# Patient Record
Sex: MALE | Race: WHITE | NOT HISPANIC OR LATINO
[De-identification: names, ages, dates, MRNs, and addresses within clinical notes are randomized per-mention and may not be internally consistent; named-entity substitution may affect disease eponyms.]

---

## 2022-01-12 PROBLEM — Z00.00 ENCOUNTER FOR PREVENTIVE HEALTH EXAMINATION: Status: ACTIVE | Noted: 2022-01-12

## 2022-01-13 ENCOUNTER — NON-APPOINTMENT (OUTPATIENT)
Age: 57
End: 2022-01-13

## 2022-01-18 ENCOUNTER — NON-APPOINTMENT (OUTPATIENT)
Age: 57
End: 2022-01-18

## 2022-01-18 ENCOUNTER — APPOINTMENT (OUTPATIENT)
Dept: CARDIOLOGY | Facility: CLINIC | Age: 57
End: 2022-01-18
Payer: COMMERCIAL

## 2022-01-18 VITALS
WEIGHT: 184 LBS | OXYGEN SATURATION: 98 % | RESPIRATION RATE: 16 BRPM | SYSTOLIC BLOOD PRESSURE: 122 MMHG | TEMPERATURE: 97.7 F | HEIGHT: 76 IN | BODY MASS INDEX: 22.41 KG/M2 | HEART RATE: 67 BPM | DIASTOLIC BLOOD PRESSURE: 76 MMHG

## 2022-01-18 VITALS — DIASTOLIC BLOOD PRESSURE: 73 MMHG | SYSTOLIC BLOOD PRESSURE: 120 MMHG

## 2022-01-18 DIAGNOSIS — Z78.9 OTHER SPECIFIED HEALTH STATUS: ICD-10-CM

## 2022-01-18 DIAGNOSIS — Z80.9 FAMILY HISTORY OF MALIGNANT NEOPLASM, UNSPECIFIED: ICD-10-CM

## 2022-01-18 PROCEDURE — 93246 EXT ECG>7D<15D RECORDING: CPT

## 2022-01-18 PROCEDURE — 93000 ELECTROCARDIOGRAM COMPLETE: CPT | Mod: 59

## 2022-01-18 PROCEDURE — 99203 OFFICE O/P NEW LOW 30 MIN: CPT

## 2022-01-18 RX ORDER — LISINOPRIL 10 MG/1
10 TABLET ORAL
Qty: 30 | Refills: 0 | Status: DISCONTINUED | COMMUNITY
Start: 2022-01-08

## 2022-01-18 RX ORDER — METOPROLOL TARTRATE 25 MG/1
25 TABLET, FILM COATED ORAL
Qty: 60 | Refills: 0 | Status: DISCONTINUED | COMMUNITY
Start: 2022-01-08

## 2022-01-18 NOTE — ASSESSMENT
[FreeTextEntry1] : ECG performed today at the office revealed a NSR 54 bpm, with normal AQRS, FL, QRS and QTc.\par

## 2022-01-18 NOTE — DISCUSSION/SUMMARY
[FreeTextEntry1] : Mr. JAVI TELLEZ is a 56 year male with recent onset of palpitations (SVT?) and newly discovered heart murmur, possible mitral regurgitation? \par At the present time He is completely asymptomatic.\par I have recommended to continue the same medications.\par Will perform a Holter ECG\par We will perform an echocardiogram to assess left ventricular and valvular function.\par We will perform routine laboratory tests\par Routine follow up in 1 month\par

## 2022-01-18 NOTE — HISTORY OF PRESENT ILLNESS
[FreeTextEntry1] : 57 yo man, , no children, works as a physicist for a journal. Recent hospitalization at [facility] on 1/6/2022 for palpitations, fast, regular, last 1-2 minutes, starts and ends suddenly, not associated with SOB, CP, dizziness or other symptoms. Not associated with coffee or caffeine ingestion. At , he was on obs for 24 hours and had an echo that revealed a "leaky valve". Discharged home on meds.\par Today for initial evaluation, still occasional palpitations. Usually at night while on bed. \par No risk factors.\par No surgeries.\par Doesnt smoke\par Occasional alcohol\par Denies the use of illicit drugs\par Received the COVID 19 vaccine\par No family history of heart disease.

## 2022-01-18 NOTE — PHYSICAL EXAM

## 2022-01-25 ENCOUNTER — APPOINTMENT (OUTPATIENT)
Dept: CARDIOLOGY | Facility: CLINIC | Age: 57
End: 2022-01-25
Payer: COMMERCIAL

## 2022-01-25 PROCEDURE — 93306 TTE W/DOPPLER COMPLETE: CPT

## 2022-01-26 DIAGNOSIS — I05.0 RHEUMATIC MITRAL STENOSIS: ICD-10-CM

## 2022-02-09 ENCOUNTER — TRANSCRIPTION ENCOUNTER (OUTPATIENT)
Age: 57
End: 2022-02-09

## 2022-03-07 ENCOUNTER — NON-APPOINTMENT (OUTPATIENT)
Age: 57
End: 2022-03-07

## 2022-03-07 LAB
ALBUMIN SERPL ELPH-MCNC: 4.6 G/DL
ALP BLD-CCNC: 52 U/L
ALT SERPL-CCNC: 16 U/L
ANION GAP SERPL CALC-SCNC: 11 MMOL/L
AST SERPL-CCNC: 17 U/L
BASOPHILS # BLD AUTO: 0.04 K/UL
BASOPHILS NFR BLD AUTO: 0.6 %
BILIRUB DIRECT SERPL-MCNC: 0.2 MG/DL
BILIRUB INDIRECT SERPL-MCNC: 0.5 MG/DL
BILIRUB SERPL-MCNC: 0.7 MG/DL
BUN SERPL-MCNC: 13 MG/DL
CALCIUM SERPL-MCNC: 9.5 MG/DL
CHLORIDE SERPL-SCNC: 106 MMOL/L
CHOLEST SERPL-MCNC: 148 MG/DL
CK SERPL-CCNC: 59 U/L
CO2 SERPL-SCNC: 26 MMOL/L
CREAT SERPL-MCNC: 0.94 MG/DL
EOSINOPHIL # BLD AUTO: 0.13 K/UL
EOSINOPHIL NFR BLD AUTO: 2 %
ESTIMATED AVERAGE GLUCOSE: 108 MG/DL
GLUCOSE SERPL-MCNC: 94 MG/DL
HBA1C MFR BLD HPLC: 5.4 %
HCT VFR BLD CALC: 46.5 %
HDLC SERPL-MCNC: 61 MG/DL
HGB BLD-MCNC: 15.7 G/DL
IMM GRANULOCYTES NFR BLD AUTO: 0.6 %
LDLC SERPL CALC-MCNC: 74 MG/DL
LYMPHOCYTES # BLD AUTO: 2.23 K/UL
LYMPHOCYTES NFR BLD AUTO: 34.6 %
MAN DIFF?: NORMAL
MCHC RBC-ENTMCNC: 31.1 PG
MCHC RBC-ENTMCNC: 33.8 GM/DL
MCV RBC AUTO: 92.1 FL
MONOCYTES # BLD AUTO: 0.58 K/UL
MONOCYTES NFR BLD AUTO: 9 %
NEUTROPHILS # BLD AUTO: 3.42 K/UL
NEUTROPHILS NFR BLD AUTO: 53.2 %
NONHDLC SERPL-MCNC: 87 MG/DL
PLATELET # BLD AUTO: 227 K/UL
POTASSIUM SERPL-SCNC: 4.7 MMOL/L
PROT SERPL-MCNC: 6.6 G/DL
RBC # BLD: 5.05 M/UL
RBC # FLD: 12.6 %
SODIUM SERPL-SCNC: 144 MMOL/L
TRIGL SERPL-MCNC: 69 MG/DL
TSH SERPL-ACNC: 2.56 UIU/ML
WBC # FLD AUTO: 6.44 K/UL

## 2022-03-09 ENCOUNTER — APPOINTMENT (OUTPATIENT)
Dept: CARDIOLOGY | Facility: CLINIC | Age: 57
End: 2022-03-09

## 2022-05-23 ENCOUNTER — FORM ENCOUNTER (OUTPATIENT)
Age: 57
End: 2022-05-23

## 2022-05-23 RX ORDER — CHLORHEXIDINE GLUCONATE 213 G/1000ML
1 SOLUTION TOPICAL ONCE
Refills: 0 | Status: DISCONTINUED | OUTPATIENT
Start: 2022-05-24 | End: 2022-06-07

## 2022-05-24 ENCOUNTER — TRANSCRIPTION ENCOUNTER (OUTPATIENT)
Age: 57
End: 2022-05-24

## 2022-05-24 ENCOUNTER — OUTPATIENT (OUTPATIENT)
Dept: OUTPATIENT SERVICES | Facility: HOSPITAL | Age: 57
LOS: 1 days | End: 2022-05-24
Payer: COMMERCIAL

## 2022-05-24 VITALS
TEMPERATURE: 98 F | DIASTOLIC BLOOD PRESSURE: 69 MMHG | RESPIRATION RATE: 20 BRPM | HEART RATE: 60 BPM | OXYGEN SATURATION: 100 % | SYSTOLIC BLOOD PRESSURE: 136 MMHG

## 2022-05-24 DIAGNOSIS — Z98.890 OTHER SPECIFIED POSTPROCEDURAL STATES: Chronic | ICD-10-CM

## 2022-05-24 DIAGNOSIS — I05.2 RHEUMATIC MITRAL STENOSIS WITH INSUFFICIENCY: ICD-10-CM

## 2022-05-24 DIAGNOSIS — Z90.89 ACQUIRED ABSENCE OF OTHER ORGANS: Chronic | ICD-10-CM

## 2022-05-24 LAB
ANION GAP SERPL CALC-SCNC: 10 MMOL/L — SIGNIFICANT CHANGE UP (ref 5–17)
BASOPHILS # BLD AUTO: 0.04 K/UL — SIGNIFICANT CHANGE UP (ref 0–0.2)
BASOPHILS NFR BLD AUTO: 0.6 % — SIGNIFICANT CHANGE UP (ref 0–2)
BUN SERPL-MCNC: 16.5 MG/DL — SIGNIFICANT CHANGE UP (ref 8–20)
CALCIUM SERPL-MCNC: 8.5 MG/DL — LOW (ref 8.6–10.2)
CHLORIDE SERPL-SCNC: 105 MMOL/L — SIGNIFICANT CHANGE UP (ref 98–107)
CO2 SERPL-SCNC: 25 MMOL/L — SIGNIFICANT CHANGE UP (ref 22–29)
CREAT SERPL-MCNC: 0.87 MG/DL — SIGNIFICANT CHANGE UP (ref 0.5–1.3)
EGFR: 101 ML/MIN/1.73M2 — SIGNIFICANT CHANGE UP
EOSINOPHIL # BLD AUTO: 0.17 K/UL — SIGNIFICANT CHANGE UP (ref 0–0.5)
EOSINOPHIL NFR BLD AUTO: 2.6 % — SIGNIFICANT CHANGE UP (ref 0–6)
GLUCOSE SERPL-MCNC: 101 MG/DL — HIGH (ref 70–99)
HCT VFR BLD CALC: 44.7 % — SIGNIFICANT CHANGE UP (ref 39–50)
HGB BLD-MCNC: 15.3 G/DL — SIGNIFICANT CHANGE UP (ref 13–17)
IMM GRANULOCYTES NFR BLD AUTO: 0.9 % — SIGNIFICANT CHANGE UP (ref 0–1.5)
LYMPHOCYTES # BLD AUTO: 1.69 K/UL — SIGNIFICANT CHANGE UP (ref 1–3.3)
LYMPHOCYTES # BLD AUTO: 25.5 % — SIGNIFICANT CHANGE UP (ref 13–44)
MCHC RBC-ENTMCNC: 31.2 PG — SIGNIFICANT CHANGE UP (ref 27–34)
MCHC RBC-ENTMCNC: 34.2 GM/DL — SIGNIFICANT CHANGE UP (ref 32–36)
MCV RBC AUTO: 91.2 FL — SIGNIFICANT CHANGE UP (ref 80–100)
MONOCYTES # BLD AUTO: 0.62 K/UL — SIGNIFICANT CHANGE UP (ref 0–0.9)
MONOCYTES NFR BLD AUTO: 9.4 % — SIGNIFICANT CHANGE UP (ref 2–14)
NEUTROPHILS # BLD AUTO: 4.04 K/UL — SIGNIFICANT CHANGE UP (ref 1.8–7.4)
NEUTROPHILS NFR BLD AUTO: 61 % — SIGNIFICANT CHANGE UP (ref 43–77)
PLATELET # BLD AUTO: 214 K/UL — SIGNIFICANT CHANGE UP (ref 150–400)
POTASSIUM SERPL-MCNC: 4.6 MMOL/L — SIGNIFICANT CHANGE UP (ref 3.5–5.3)
POTASSIUM SERPL-SCNC: 4.6 MMOL/L — SIGNIFICANT CHANGE UP (ref 3.5–5.3)
RBC # BLD: 4.9 M/UL — SIGNIFICANT CHANGE UP (ref 4.2–5.8)
RBC # FLD: 12.1 % — SIGNIFICANT CHANGE UP (ref 10.3–14.5)
SODIUM SERPL-SCNC: 140 MMOL/L — SIGNIFICANT CHANGE UP (ref 135–145)
WBC # BLD: 6.62 K/UL — SIGNIFICANT CHANGE UP (ref 3.8–10.5)
WBC # FLD AUTO: 6.62 K/UL — SIGNIFICANT CHANGE UP (ref 3.8–10.5)

## 2022-05-24 PROCEDURE — 93312 ECHO TRANSESOPHAGEAL: CPT | Mod: 26

## 2022-05-24 PROCEDURE — 93320 DOPPLER ECHO COMPLETE: CPT | Mod: 26

## 2022-05-24 PROCEDURE — 93325 DOPPLER ECHO COLOR FLOW MAPG: CPT | Mod: 26

## 2022-05-24 PROCEDURE — 93005 ELECTROCARDIOGRAM TRACING: CPT

## 2022-05-24 PROCEDURE — 85025 COMPLETE CBC W/AUTO DIFF WBC: CPT

## 2022-05-24 PROCEDURE — 93312 ECHO TRANSESOPHAGEAL: CPT

## 2022-05-24 PROCEDURE — 93320 DOPPLER ECHO COMPLETE: CPT

## 2022-05-24 PROCEDURE — 93010 ELECTROCARDIOGRAM REPORT: CPT

## 2022-05-24 PROCEDURE — 36415 COLL VENOUS BLD VENIPUNCTURE: CPT

## 2022-05-24 PROCEDURE — 93325 DOPPLER ECHO COLOR FLOW MAPG: CPT

## 2022-05-24 PROCEDURE — 80048 BASIC METABOLIC PNL TOTAL CA: CPT

## 2022-05-24 NOTE — DISCHARGE NOTE PROVIDER - NSDCCPCAREPLAN_GEN_ALL_CORE_FT
PRINCIPAL DISCHARGE DIAGNOSIS  Diagnosis: Severe mitral regurgitation  Assessment and Plan of Treatment: Follow up with Cardiologist outpatient

## 2022-05-24 NOTE — DISCHARGE NOTE PROVIDER - NSDCCPTREATMENT_GEN_ALL_CORE_FT
PRINCIPAL PROCEDURE  Procedure: Complete transesophageal echocardiography (LOULOU) with Doppler imaging  Findings and Treatment: Notify your doctor if you develop a fever, cough up blood, have any chest pain, palpitations or difficulty breathing. You may take a throat lozenge if you develop a sore throat or try warm salt water gargle. Resume your diet with soft foods first. Follow up with your cardiologist within 2 weeks for a follow up or sooner with any concerns. Report to the nearest ER with any emergencies.

## 2022-05-24 NOTE — PROGRESS NOTE ADULT - SUBJECTIVE AND OBJECTIVE BOX
Cardiology NP post procedure note:     -s/p LOULOU: severe MR; no endocarditis seen (prelim report; official report to follow)      MEDICATIONS  (STANDING):  chlorhexidine 4% Liquid 1 Application(s) Topical Once      Allergies:  No Known Allergies    PAST MEDICAL & SURGICAL HISTORY:  Hypertension      Palpitations      History of tonsillectomy      History of circumcision          Vital Signs Last 24 Hrs  T(C): 36.7 (24 May 2022 09:10), Max: 36.7 (24 May 2022 09:10)  T(F): 98.1 (24 May 2022 09:10), Max: 98.1 (24 May 2022 09:10)  HR: 60 (24 May 2022 09:10) (60 - 60)  BP: 136/69 (24 May 2022 09:10) (136/69 - 136/69)  BP(mean): --  RR: 20 (24 May 2022 09:10) (20 - 20)  SpO2: 100% (24 May 2022 09:10) (100% - 100%)    Physical Exam:  Constitutional: NAD, AAOx3  Cardiovascular: +S1S2 RRR  Pulmonary: CTA b/l, unlabored  GI: soft NTND +BS  Extremities: no pedal edema, +distal pulses b/l  Neuro: non focal, ZHANG x4    LABS:                        15.3   6.62  )-----------( 214      ( 24 May 2022 08:57 )             44.7     05-24    140  |  105  |  16.5  ----------------------------<  101<H>  4.6   |  25.0  |  0.87    Ca    8.5<L>      24 May 2022 08:57            RADIOLOGY & ADDITIONAL TESTS:

## 2022-05-24 NOTE — DISCHARGE NOTE PROVIDER - CARE PROVIDER_API CALL
Jesus Chaudhari)  Cardiovascular Disease; Internal Medicine; Interventional Cardiology  [address] [place] [address]  Phone: [phone]  Fax: [phone]  Established Patient  Follow Up Time: 2 weeks

## 2022-05-24 NOTE — DISCHARGE NOTE PROVIDER - NSDCFUADDINST_GEN_ALL_CORE_FT
Notify your doctor if you develop a fever, cough up blood, have any chest pain, palpitations or difficulty breathing. You may take a throat lozenge if you develop a sore throat or try warm salt water gargle. Resume your diet with soft foods first. Follow up with your cardiologist within 2 weeks for a follow up or sooner with any concerns. Report to the nearest ER with any emergencies.

## 2022-05-24 NOTE — DISCHARGE NOTE PROVIDER - HOSPITAL COURSE
This is a 55 y/o male with h/o HTN s/p recent hospitalization at [facility] on 1/6/22 for palpitations, fast, regular, last 1-2 minutes, starts and ends suddenly, not associated with SOB, CP, dizziness or other symptoms.  Not associated with coffee or caffeine ingestion.  At , he was observed for 24 hours and had an echo that revealed a "leaky valve."  He was discharged home and followed up with Dr. Chaudhari in the office.  Complains of palpitations usually at night while in bed.  TTE  revealed normal LVSF; EF 60-65%; severely enlarged LA; moderately dilated RA; anterior MV leaflet is thickened, possibility of infective of marantic endocarditis is raised, partial flail of A2 resulting in a severe eccentric posteriorly directed jet of MR; LA moderately enlarged; mild to moderate TR; no pericardial effusion.  Routine blood work was done.  Patient presents today for LOULOU to assess the MR and the leaflets.  LOULOU revealed severe MR with no endocarditis seen (prelim report; official report to follow)  -May discharge patient to home once fully awake and tolerating PO intake while remaining stable  -Post LOULOU VS as per orders  -Follow up with Dr. Chaudhari as outpatient in one to two weeks  -Continue current med regimen  -Activity instructions discussed with patients verbal understanding

## 2022-05-24 NOTE — H&P PST ADULT - ASSESSMENT
Palpitations and mitral regurgitation with partial flail of A2 for LOULOU for further evaluation    -NPO for procedure  -Consent to be obtained by MD

## 2022-05-24 NOTE — ASU PATIENT PROFILE, ADULT - FALL HARM RISK - PT AGE POPULATION HIDDEN
Renal function is stable, you have chronic kidney disease stage 3  Blood pressure slightly elevated, recommend home monitoring of blood pressure with goal blood pressure less than 135/85  If blood pressure is persistently more than 140/90 please let me know  Discussed about low-sodium diet  Continue same medication  Avoid nephrotoxins  Will follow up in 4 months with repeat labs 
Adult

## 2022-05-24 NOTE — H&P PST ADULT - NSANTHOSAYNRD_GEN_A_CORE
No. BRITNI screening performed.  STOP BANG Legend: 0-2 = LOW Risk; 3-4 = INTERMEDIATE Risk; 5-8 = HIGH Risk

## 2022-05-24 NOTE — DISCHARGE NOTE NURSING/CASE MANAGEMENT/SOCIAL WORK - NSDCPEFALRISK_GEN_ALL_CORE
For information on Fall & Injury Prevention, visit: [email or web address] OR  [email or web address] OR  https://www.cdc.gov/steadi/patient.html

## 2022-05-24 NOTE — DISCHARGE NOTE NURSING/CASE MANAGEMENT/SOCIAL WORK - PATIENT PORTAL LINK FT
You can access the FollowMyHealth Patient Portal offered by [facility] by registering at the following website: [email or web address]. By joining [facility]’s FollowMyHealth portal, you will also be able to view your health information using other applications (apps) compatible with our system.

## 2022-05-24 NOTE — DISCHARGE NOTE PROVIDER - NSDCMRMEDTOKEN_GEN_ALL_CORE_FT
Aspir 81 oral delayed release tablet: 1 tab(s) orally once a day  lisinopril 10 mg oral tablet: 1 tab(s) orally once a day  Metoprolol Tartrate 25 mg oral tablet: 1 tab(s) orally 2 times a day

## 2022-05-24 NOTE — PROGRESS NOTE ADULT - ASSESSMENT
A/P: This is a 55 y/o male with h/o HTN s/p recent [place] [facility] [place] for palpitations, fast, regular, last 1-2 minutes, starts and ends suddenly, not associated with SOB, CP, dizziness or other symptoms.  Not associated with coffee or caffeine ingestion.  At , he was observed for 24 hours and had an echo that revealed a "leaky valve."  He was discharged home and followed up with Dr. Chaudhari in the office.  Complains of palpitations usually at night while in bed.  TTE  revealed normal LVSF; EF 60-65%; severely enlarged LA; moderately dilated RA; anterior MV leaflet is thickened, possibility of infective of marantic endocarditis is raised, partial flail of A2 resulting in a severe eccentric posteriorly directed jet of MR; LA moderately enlarged; mild to moderate TR; no pericardial effusion.  Routine blood work was done.  Patient presents today for LOULOU to assess the MR and the leaflets.  LOULOU revealed severe MR with no endocarditis seen (prelim report; official report to follow)  -May discharge patient to home once fully awake and tolerating PO intake while remaining stable  -Post LOULOU VS as per orders  -Follow up with Dr. Chaudhari as outpatient in one to two weeks  -Continue current med regimen  -Activity instructions discussed with patients verbal understanding

## 2022-05-25 PROBLEM — R00.2 PALPITATIONS: Chronic | Status: ACTIVE | Noted: 2022-05-24

## 2022-05-25 PROBLEM — I10 ESSENTIAL (PRIMARY) HYPERTENSION: Chronic | Status: ACTIVE | Noted: 2022-05-24

## 2022-06-06 ENCOUNTER — RX RENEWAL (OUTPATIENT)
Age: 57
End: 2022-06-06

## 2022-06-07 ENCOUNTER — NON-APPOINTMENT (OUTPATIENT)
Age: 57
End: 2022-06-07

## 2022-06-07 ENCOUNTER — APPOINTMENT (OUTPATIENT)
Dept: CARDIOLOGY | Facility: CLINIC | Age: 57
End: 2022-06-07
Payer: MEDICARE

## 2022-06-07 VITALS
HEART RATE: 63 BPM | OXYGEN SATURATION: 98 % | BODY MASS INDEX: 22.16 KG/M2 | WEIGHT: 182 LBS | TEMPERATURE: 98.5 F | SYSTOLIC BLOOD PRESSURE: 120 MMHG | DIASTOLIC BLOOD PRESSURE: 71 MMHG | HEIGHT: 76 IN

## 2022-06-07 PROCEDURE — 93000 ELECTROCARDIOGRAM COMPLETE: CPT

## 2022-06-07 PROCEDURE — 99214 OFFICE O/P EST MOD 30 MIN: CPT

## 2022-06-07 NOTE — DISCUSSION/SUMMARY
[FreeTextEntry1] : Mr. JAVI TELLEZ is a 56 year male with recent onset of palpitations (SVT?) and newly discovered severe mitral regurgitation. Had a TTE and LOULOU that revealed severe eccentric MR with a regurgitant volume of 190 mL an LVEF=58%. Marantic endocarditis? Has not undergone evaluation for possible etiologies. \par He also has an abdominal bruit.  \par Holter revealed two runs of NSVT and SVT. \par At the present time He is completely asymptomatic.\par I have recommended to continue the same medications.\par Will request an abdominal a thoracic aorta CT.\par Will request a surgical consultation with Dr Pollock for possible MVR\par Routine follow up in 3 months\par

## 2022-06-07 NOTE — PHYSICAL EXAM

## 2022-06-07 NOTE — ASSESSMENT
[FreeTextEntry1] : ECG performed today at the office revealed a NSR 58 bpm, with normal AQRS, OK, QRS and QTc.\par

## 2022-06-07 NOTE — HISTORY OF PRESENT ILLNESS
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 Not associated with coffee or caffeine ingestion. At , he was on obs for 24 hours and had an echo that revealed a "leaky valve". Discharged home on meds.\par Had an TTE that revealed a thickened anterior mitral valve leaflet. Partial flail A2 with severe eccentric MR. The LA is moderately enlarged. Possible marantic endocarditis?\par On 5/24/2022 he had a LOULOU that revealed ruptured chordae with thickened and redundant mitral valve leaflet. Severe MR.  Mild TR. LVEF=57%. \par Today for follow up. At the present time patient is completely asymptomatic and denies CP, SOB, orthopnea or PND. Denies palpitations, dizziness or ankle swelling.\par No risk factors.\par No surgeries.\par Doesnt smoke\par Occasional alcohol\par Denies the use of illicit drugs\par Received the COVID 19 vaccine\par No family history of heart disease.

## 2022-06-14 ENCOUNTER — APPOINTMENT (OUTPATIENT)
Dept: CARDIOTHORACIC SURGERY | Facility: CLINIC | Age: 57
End: 2022-06-14
Payer: MEDICARE

## 2022-06-14 VITALS
HEART RATE: 66 BPM | HEIGHT: 76 IN | DIASTOLIC BLOOD PRESSURE: 82 MMHG | BODY MASS INDEX: 22.16 KG/M2 | OXYGEN SATURATION: 98 % | RESPIRATION RATE: 16 BRPM | SYSTOLIC BLOOD PRESSURE: 152 MMHG | WEIGHT: 182 LBS

## 2022-06-14 PROCEDURE — 99205 OFFICE O/P NEW HI 60 MIN: CPT

## 2022-06-14 NOTE — PHYSICAL EXAM
[General Appearance - Well Nourished] : well nourished [General Appearance - Well Developed] : well developed [Sclera] : the sclera and conjunctiva were normal [Outer Ear] : the ears and nose were normal in appearance [Neck Appearance] : the appearance of the neck was normal [Respiration, Rhythm And Depth] : normal respiratory rhythm and effort [Auscultation Breath Sounds / Voice Sounds] : lungs were clear to auscultation bilaterally [Heart Rate And Rhythm] : heart rate was normal and rhythm regular [FreeTextEntry1] : NYHAC I   Grade 3/6 systolic murmur [Examination Of The Chest] : the chest was normal in appearance [Right Carotid Bruit] : no bruit heard over the right carotid [Left Carotid Bruit] : no bruit heard over the left carotid [2+] : left 2+ [Abnormal Walk] : normal gait [Motor Tone] : muscle strength and tone were normal [Skin Color & Pigmentation] : normal skin color and pigmentation [Sensation] : the sensory exam was normal to light touch and pinprick [Motor Exam] : the motor exam was normal [Oriented To Time, Place, And Person] : oriented to person, place, and time [Impaired Insight] : insight and judgment were intact

## 2022-06-14 NOTE — REVIEW OF SYSTEMS
[Palpitations] : palpitations [SOB on Exertion] : shortness of breath during exertion [Negative] : Heme/Lymph [Feeling Poorly] : not feeling poorly [Feeling Tired] : not feeling tired [Chest Pain] : no chest pain [Lower Ext Edema] : no extremity edema [Shortness Of Breath] : no shortness of breath [Cough] : no cough [Orthopnea] : no orthopnea

## 2022-06-14 NOTE — DATA REVIEWED
[FreeTextEntry1] : Transesophageal Echocardiogram from 5/24/22 at [facility] \par - LVEF 55-60%\par - Ruptured chordae and possibly tip of papillary muscle to P2 and P3 scallops with thickened and redundant mitral valve leaflet resulting in severe and posteriorly directed MR jet. MR volume is 190 ml.\par - Mild tricuspid valve regurgitation\par - A pattern of systolic flow reversal, suggestive of severe mitral valve regurgitation is recorded from the right lower pulmonary vein.\par \par \par \par \par \par Transthoracic Echocardiogram from 1/25/22 at [facility] \par Summary:\par 1. Technically good study.\par 2. Normal global left ventricular systolic function.\par 3. Left ventricular ejection fraction, by visual estimation, is 60 to 65%.\par 4. Mildly increased left ventricular internal cavity size.\par 5. LV EF by Biplane MOD = 63%. LAP is estimated to be in normal range.\par 6. Severely enlarged left atrium.\par 7. Moderately dilated right atrium.\par 8. The anterior mitral valve leaflet is thickened. Possibility of infective of marantic endocarditis is raised. There is partial flail of A2, resulting in a severe eccentric posteriorly directed jet of MR. The left atrium is moderately enlarged. DW Dr. Chaudhari. Recommend LOULOU, blood cultures. and if needed rheumatologic workup.\par 9. Mild-moderate tricuspid regurgitation.\par 10. There is no evidence of pericardial effusion.

## 2022-06-14 NOTE — CONSULT LETTER
[Dear  ___] : Dear  [unfilled], [Consult Letter:] : I had the pleasure of evaluating your patient, [unfilled]. [Please see my note below.] : Please see my note below. [Consult Closing:] : Thank you very much for allowing me to participate in the care of this patient.  If you have any questions, please do not hesitate to contact me. [Sincerely,] : Sincerely, [FreeTextEntry2] : Jesus Chaudhari MD [FreeTextEntry3] : Babar Pollock MD\par  of Cardiothoracic Surgery\par [facility]\par [address] \[place] [address]\par [phone]\par

## 2022-06-14 NOTE — ASSESSMENT
[FreeTextEntry1] : There is a prolapse of one of the leaflets due to a ruptured chordae. Over time the heart can become weaker. At this time he is very mildly effected by symptoms. Options presented were to perform a mitral valve repair with the possibility of a replacement. We discussed the risk and benefit of utilizing mechanical vs bioprosthetic valve. \par \par Surgical intervention should be considered at this time to prevent future heart function deficit and prevention of rhythm disturbances such as atrial fibrillation. Mr Corcoran reports having a CTA of the chest last week at [facility]. The report is currently unread. Should his calcium score be zero we can defer cardiac catheterization.\par \par Risks benefits and alternatives to mitral valve repair with possible replacement were discussed with the patient in detail. Risks discussed included, but not limited to infection, bleeding, myocardial infarction, cerebrovascular accident, renal failure, vascular injury requiring intervention, cardiac rupture, and death. \par \par The procedure, hospital stay and recovery was discussed in detail.  All questions addressed. Patient would like to discuss the options with his wafe and call with the decision to proceed.\par \par PLAN:\par - Cardiac Catheterization if Calcium Score is greater than zero ([facility] on 6/10/22)\par - Pulmonary Function Testing\par - Carotid Ultrasound\par - Mitral Valve Repair with possible replacement (Bioprosthetic if needed)

## 2022-06-14 NOTE — HISTORY OF PRESENT ILLNESS
[FreeTextEntry1] : Mr. CORCORAN is a 56 year old male referred by Dr. Chaudhari who presents for consultation. His past medical history includes tonsillectomy. 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 \par \par He was evaluated at [facility] January 6th for palpitations and shortness of breath. An echocardiogram was performed at that time revealing a "leaky valve". He had followed up with Cardiology post discharge and echocardiogram was performed revealing mitral valve dysfunction. \par \par Mr Corcoran presents to the office today to discuss possible mitral valve intervention. He denies any chest pain, lower extremity edema, or syncope. He has been having palpitations at night when he is in bed associated with mild shortness of breath. He currently works full time as an  for a [employer].

## 2022-09-01 DIAGNOSIS — Z01.811 ENCOUNTER FOR PREPROCEDURAL RESPIRATORY EXAMINATION: ICD-10-CM

## 2022-09-14 ENCOUNTER — NON-APPOINTMENT (OUTPATIENT)
Age: 57
End: 2022-09-14

## 2022-09-14 NOTE — H&P PST ADULT - NSICDXFAMILYHX_GEN_ALL_CORE_FT
FAMILY HISTORY:  Mother  Still living? Unknown  Family history of atrial fibrillation, Age at diagnosis: Age Unknown

## 2022-09-14 NOTE — H&P PST ADULT - NSICDXPASTMEDICALHX_GEN_ALL_CORE_FT
PAST MEDICAL HISTORY:  Hypertension     Mitral valve stenosis, moderate     Nonrheumatic mitral valve regurgitation     Palpitations

## 2022-09-14 NOTE — H&P PST ADULT - OTHER CARE PROVIDERS
Jesus Chaudhari ([facility], [address] [place] [address], Phone: [phone], Fax: [phone]), Babar Pollock ([facility], [address] [place] [address], Tel: [phone])

## 2022-09-14 NOTE — H&P PST ADULT - ASSESSMENT
Assessment:     ASA:   Mall:   ABR:   COVID:   GFR:   Creatinine:   10 Year ASCVD Risk:     Problem List:   1.   ·	LHC and possible intervention. Consent obtained.  ·	Procedure explained and questions answered.   ·	Will start DAPT if PCI performed.  ·	IV:  mL IV over 1 hour pre and post procedure  ·	Aspirin if not taken today.    2.     Discharge Planning:   ·	Discharge today if no PCI performed.  ·	Discharge in AM if PCI performed. Assessment: 55y/o male never smoker with history of HTN who went to [facility] for palpitations and had an echo done that showed a "leaky valve." He followed up with Dr. Chaudhari who did and echo which showed a ruptured chordae and possibly tip of papillary muscle to the P2 and P3 scallops with thickened and redundant mitral valve leaflet resulting in severe and posteriorly directed MR jet.     ASA: 2  Mall: 2  ABR:   COVID: Negative 9/12/2022  GFR:   Creatinine:   10 Year ASCVD Risk:     Problem List:   1. MV regurgitation secondary to ruptured chordae  ·	LHC and possible intervention. Consent obtained.  ·	Procedure explained and questions answered.   ·	Will start DAPT if PCI performed.  ·	IV:  mL IV over 1 hour pre and post procedure  ·	Aspirin if not taken today.    2. HTN  ·	BP Range Last 24 Hours:   ·	Beta Blocker:   ·	RAASi:   ·	CCB:   ·	Other:     Discharge Planning:   ·	Discharge today if no PCI performed.  ·	Discharge in AM if PCI performed. Assessment: 55y/o male never smoker with history of HTN who went to [facility] for palpitations and had an echo done that showed a "leaky valve." He followed up with Dr. Chaudhari who did and echo which showed a ruptured chordae and possibly tip of papillary muscle to the P2 and P3 scallops with thickened and redundant mitral valve leaflet resulting in severe and posteriorly directed MR jet.     ASA: 2  Mall: 2  ABR: 1.6%  COVID: Negative 9/12/2022  GFR: 101  Creatinine: 0.89  10 Year ASCVD Risk: 5.0%    Problem List:   1. MV regurgitation secondary to ruptured chordae  ·	C and possible intervention. Consent obtained.  ·	Procedure explained and questions answered.   ·	Will start DAPT if PCI performed.  ·	IV:  mL IV over 1 hour pre and post procedure  ·	Aspirin if not taken today.    2. HTN  ·	Beta Blocker: Metoprolol 25 mg BID  ·	RAASi: Lisinopril 10 mg daily  ·	CCB: N/A  ·	Other: N/A    Discharge Planning:   ·	Discharge today if no PCI performed.  ·	Discharge in AM if PCI performed.

## 2022-09-14 NOTE — H&P PST ADULT - PRIMARY CARE PROVIDER
Nelsy Reyes ( Nelsy Pierce ([address], [place] [address], Phone: [phone], Fax: [phone])

## 2022-09-14 NOTE — H&P PST ADULT - HISTORY OF PRESENT ILLNESS
Symptoms:        Angina (Class):        Ischemic Symptoms:     Heart Failure:        Systolic/Diastolic/Combined:        NYHA Class (within 2 weeks):     Assessment of LVEF:       EF: 40%       Assessed by: TTE       Date: 6/15/2022    Prior Cardiac Interventions:       PCI's: N/A       CABG: N/A    Noninvasive Testing:   Stress Test: N/A    LOULOU: 5/24/2022  Left Ventricle: Global LV systolic function was normal. Left ventricular ejection fraction, by visual estimation, is 55 to 60%. LVEF by 3D imaging is 57%.  Right Ventricle: Normal right ventricular size and function.  Left Atrium: Left atrial enlargement. No left atrial appendage thrombus is seen. Color flow doppler and intravenous injection of agitated saline demonstrates the presence of an intact intra atrial septum.  Right Atrium: Normal right atrial size.  Pericardium: There is no evidence of pericardial effusion.  Mitral Valve: Ruptured chordae and possibly tip of papillary muscle to the P2 and P3 scallops with thickened and redundant mitral valve leaflet resulting in severe and posteriorly directed MR jet. MR volume is 190 ml.  Tricuspid Valve: The tricuspid valve is normal in structure. Mild tricuspid regurgitation is visualized.  Aortic Valve: The aortic valve is trileaflet. No evidence of aortic stenosis. No evidence of aortic valve regurgitation is seen.  Pulmonic Valve: Structurally normal pulmonic valve, with normal leaflet excursion.  Aorta: The aortic root, ascending aorta, aortic arch and descending aorta are all structurally normal, with no evidence of dilitation or obstruction.  Venous: A pattern of systolic flow reversal, suggestive of severe mitral regurgitation is recorded from the right lower pulmonary vein.  Shunts: Agitated saline contrast was given intravenously to evaluate for intracardiac shunting.    Antianginal Therapies:        Beta Blockers: N/A       Calcium Channel Blockers: N/A       Long Acting Nitrates: N/A       Ranexa: N/A    Associated Risk Factors:        Frailty: N/A       Cerebrovascular Disease: N/A       Chronic Lung Disease: N/A       Peripheral Arterial Disease: N/A       Chronic Kidney Disease (if yes, what is GFR): N/A       Uncontrolled Diabetes (if yes, what is HgbA1C or FBS): N/A       Poorly Controlled Hypertension (if yes, what is SBP): N/A       Morbid Obesity (if yes, what is BMI): N/A       History of Recent Ventricular Arrhythmia: N/A       Inability to Ambulate Safely: N/A       Need for Therapeutic Anticoagulation: N/A       Antiplatelet or Contrast Allergy: N/A    Social History:        Marital:        Tobacco:        ETOH:        Caffeine:     ROS:   General: No fevers/chils. No fatigue  HEENT: No hearing loss. No visual disturbances. No headaches. No epistaxis.  Pulmonary: No dyspnea. No wheeze. No cough.  CV: No chest pain. No DOUGHERTY. No palpitations. No orthopnea. No PND. No edema.  GI: No BRBPR. No melena. No nausea.  : No hematuria.  Neuro: No weakness. No paresthesia. No syncope.    T(C): --  HR: --  BP: --  RR: --  SpO2: --  Physical Exam:   General: Awake, alert, speech clear, no acute distress.  Neck: No bruit, no JVD.  Chest: S1, S2. No murmur. CTA.  Abdomen: Soft. Nondistended.  Extremities: No edema. Pulses: DP: Right: 2+, Left: 2+, Radial: Right: 2+, Left: 2+ 57y/o male never smoker who went to [facility] [place] for palpitations and had an echo done that showed a "leaky valve." He followed up with Dr. Chaudhari who did and echo which showed a ruptured chordae and possibly tip of papillary muscle to the P2 and P3 scallops with thickened and redundant mitral valve leaflet resulting in severe and posteriorly directed MR jet.     Symptoms:        Angina (Class):        Ischemic Symptoms:     Heart Failure:        Systolic/Diastolic/Combined:        NYHA Class (within 2 weeks):     Assessment of LVEF:       EF: 40%       Assessed by: TTE       Date: 6/15/2022    Prior Cardiac Interventions:       PCI's: N/A       CABG: N/A    Noninvasive Testing:   Stress Test: N/A    LOULOU: 5/24/2022  Left Ventricle: Global LV systolic function was normal. Left ventricular ejection fraction, by visual estimation, is 55 to 60%. LVEF by 3D imaging is 57%.  Right Ventricle: Normal right ventricular size and function.  Left Atrium: Left atrial enlargement. No left atrial appendage thrombus is seen. Color flow doppler and intravenous injection of agitated saline demonstrates the presence of an intact intra atrial septum.  Right Atrium: Normal right atrial size.  Pericardium: There is no evidence of pericardial effusion.  Mitral Valve: Ruptured chordae and possibly tip of papillary muscle to the P2 and P3 scallops with thickened and redundant mitral valve leaflet resulting in severe and posteriorly directed MR jet. MR volume is 190 ml.  Tricuspid Valve: The tricuspid valve is normal in structure. Mild tricuspid regurgitation is visualized.  Aortic Valve: The aortic valve is trileaflet. No evidence of aortic stenosis. No evidence of aortic valve regurgitation is seen.  Pulmonic Valve: Structurally normal pulmonic valve, with normal leaflet excursion.  Aorta: The aortic root, ascending aorta, aortic arch and descending aorta are all structurally normal, with no evidence of dilitation or obstruction.  Venous: A pattern of systolic flow reversal, suggestive of severe mitral regurgitation is recorded from the right lower pulmonary vein.  Shunts: Agitated saline contrast was given intravenously to evaluate for intracardiac shunting.    Antianginal Therapies:        Beta Blockers: N/A       Calcium Channel Blockers: N/A       Long Acting Nitrates: N/A       Ranexa: N/A    Associated Risk Factors:        Frailty: N/A       Cerebrovascular Disease: N/A       Chronic Lung Disease: N/A       Peripheral Arterial Disease: N/A       Chronic Kidney Disease (if yes, what is GFR): N/A       Uncontrolled Diabetes (if yes, what is HgbA1C or FBS): N/A       Poorly Controlled Hypertension (if yes, what is SBP): N/A       Morbid Obesity (if yes, what is BMI): N/A       History of Recent Ventricular Arrhythmia: N/A       Inability to Ambulate Safely: N/A       Need for Therapeutic Anticoagulation: N/A       Antiplatelet or Contrast Allergy: N/A    Social History:        Marital:        Tobacco:        ETOH:        Caffeine:     ROS:   General: No fevers/chills. No fatigue  HEENT: No hearing loss. No visual disturbances. No headaches. No epistaxis.  Pulmonary: No dyspnea. No wheeze. No cough.  CV: No chest pain. No DOUGHERTY. No palpitations. No orthopnea. No PND. No edema.  GI: No BRBPR. No melena. No nausea.  : No hematuria.  Neuro: No weakness. No paresthesia. No syncope.    T(C): --  HR: --  BP: --  RR: --  SpO2: --  Physical Exam:   General: Awake, alert, speech clear, no acute distress.  Neck: No bruit, no JVD.  Chest: S1, S2. No murmur. CTA.  Abdomen: Soft. Nondistended.  Extremities: No edema. Pulses: DP: Right: 2+, Left: 2+, Radial: Right: 2+, Left: 2+ 57y/o male never smoker with history of HTN who [place] [facility] [place] and had an echo done that showed a "leaky valve." He followed up with Dr. Chaudhari who did and echo which showed a ruptured chordae and possibly tip of papillary muscle to the P2 and P3 scallops with thickened and redundant mitral valve leaflet resulting in severe and posteriorly directed MR jet.     Symptoms:        Angina (Class): N/A       Ischemic Symptoms: N/A    Heart Failure: N/A    Assessment of LVEF:       EF: 40%       Assessed by: TTE       Date: 6/15/2022    Prior Cardiac Interventions:       PCI's: N/A       CABG: N/A    Noninvasive Testing:   Stress Test: N/A    LOULOU: 5/24/2022  Left Ventricle: Global LV systolic function was normal. Left ventricular ejection fraction, by visual estimation, is 55 to 60%. LVEF by 3D imaging is 57%.  Right Ventricle: Normal right ventricular size and function.  Left Atrium: Left atrial enlargement. No left atrial appendage thrombus is seen. Color flow doppler and intravenous injection of agitated saline demonstrates the presence of an intact intra atrial septum.  Right Atrium: Normal right atrial size.  Pericardium: There is no evidence of pericardial effusion.  Mitral Valve: Ruptured chordae and possibly tip of papillary muscle to the P2 and P3 scallops with thickened and redundant mitral valve leaflet resulting in severe and posteriorly directed MR jet. MR volume is 190 ml.  Tricuspid Valve: The tricuspid valve is normal in structure. Mild tricuspid regurgitation is visualized.  Aortic Valve: The aortic valve is trileaflet. No evidence of aortic stenosis. No evidence of aortic valve regurgitation is seen.  Pulmonic Valve: Structurally normal pulmonic valve, with normal leaflet excursion.  Aorta: The aortic root, ascending aorta, aortic arch and descending aorta are all structurally normal, with no evidence of dilitation or obstruction.  Venous: A pattern of systolic flow reversal, suggestive of severe mitral regurgitation is recorded from the right lower pulmonary vein.  Shunts: Agitated saline contrast was given intravenously to evaluate for intracardiac shunting.    Antianginal Therapies:        Beta Blockers: Metoprolol 25 mg BID       Calcium Channel Blockers: N/A       Long Acting Nitrates: N/A       Ranexa: N/A    Associated Risk Factors:        Frailty: N/A       Cerebrovascular Disease: N/A       Chronic Lung Disease: N/A       Peripheral Arterial Disease: N/A       Chronic Kidney Disease (if yes, what is GFR): N/A       Uncontrolled Diabetes (if yes, what is HgbA1C or FBS): N/A       Poorly Controlled Hypertension (if yes, what is SBP): N/A       Morbid Obesity (if yes, what is BMI): N/A       History of Recent Ventricular Arrhythmia: N/A       Inability to Ambulate Safely: N/A       Need for Therapeutic Anticoagulation: N/A       Antiplatelet or Contrast Allergy: N/A    Social History:        Marital: , lives with wife       Tobacco: never smoker       ETOH: 1-2 drinks (wine/beer) daily       Caffeine: 2 cups coffee/day    ROS:   General: No fevers/chills. No fatigue  HEENT: No hearing loss. No visual disturbances. No headaches. No epistaxis.  Pulmonary: No dyspnea. No wheeze. No cough.  CV: No chest pain. No DOUGHERTY. + palpitations. No orthopnea. No PND. No edema.  GI: No BRBPR. No melena. No nausea.  : No hematuria.  Neuro: No weakness. No paresthesia. No syncope.    T(C): --  HR: --  BP: --  RR: --  SpO2: --  Physical Exam:   General: Awake, alert, speech clear, no acute distress.  Neck: No bruit, no JVD.  Chest: S1, S2. No murmur. CTA.  Abdomen: Soft. Nondistended.  Extremities: No edema. Pulses: DP: Right: 2+, Left: 2+, Radial: Right: 2+, Left: 2+. Good ulnar circulation demonstrated by Tanner test. 57y/o male never smoker with history of HTN [place] [facility] [place] and had an echo done that showed a "leaky valve." He followed up with Dr. Chaudhari who did and echo which showed a ruptured chordae and possibly tip of papillary muscle to the P2 and P3 scallops with thickened and redundant mitral valve leaflet resulting in severe and posteriorly directed MR jet.     Symptoms:        Angina (Class): N/A       Ischemic Symptoms: N/A    Heart Failure: N/A    Assessment of LVEF:       EF: 40%       Assessed by: TTE       Date: 6/15/2022    Prior Cardiac Interventions:       PCI's: N/A       CABG: N/A    Noninvasive Testing:   Stress Test: N/A    LOULOU: 5/24/2022  Left Ventricle: Global LV systolic function was normal. Left ventricular ejection fraction, by visual estimation, is 55 to 60%. LVEF by 3D imaging is 57%.  Right Ventricle: Normal right ventricular size and function.  Left Atrium: Left atrial enlargement. No left atrial appendage thrombus is seen. Color flow doppler and intravenous injection of agitated saline demonstrates the presence of an intact intra atrial septum.  Right Atrium: Normal right atrial size.  Pericardium: There is no evidence of pericardial effusion.  Mitral Valve: Ruptured chordae and possibly tip of papillary muscle to the P2 and P3 scallops with thickened and redundant mitral valve leaflet resulting in severe and posteriorly directed MR jet. MR volume is 190 ml.  Tricuspid Valve: The tricuspid valve is normal in structure. Mild tricuspid regurgitation is visualized.  Aortic Valve: The aortic valve is trileaflet. No evidence of aortic stenosis. No evidence of aortic valve regurgitation is seen.  Pulmonic Valve: Structurally normal pulmonic valve, with normal leaflet excursion.  Aorta: The aortic root, ascending aorta, aortic arch and descending aorta are all structurally normal, with no evidence of dilitation or obstruction.  Venous: A pattern of systolic flow reversal, suggestive of severe mitral regurgitation is recorded from the right lower pulmonary vein.  Shunts: Agitated saline contrast was given intravenously to evaluate for intracardiac shunting.    Antianginal Therapies:        Beta Blockers: Metoprolol 25 mg BID       Calcium Channel Blockers: N/A       Long Acting Nitrates: N/A       Ranexa: N/A    Associated Risk Factors:        Frailty: N/A       Cerebrovascular Disease: N/A       Chronic Lung Disease: N/A       Peripheral Arterial Disease: N/A       Chronic Kidney Disease (if yes, what is GFR): N/A       Uncontrolled Diabetes (if yes, what is HgbA1C or FBS): N/A       Poorly Controlled Hypertension (if yes, what is SBP): N/A       Morbid Obesity (if yes, what is BMI): N/A       History of Recent Ventricular Arrhythmia: N/A       Inability to Ambulate Safely: N/A       Need for Therapeutic Anticoagulation: N/A       Antiplatelet or Contrast Allergy: N/A    Social History:        Marital: , lives with wife       Tobacco: never smoker       ETOH: 1-2 drinks (wine/beer) daily       Caffeine: 2 cups coffee/day    ROS:   General: No fevers/chills. No fatigue  HEENT: No hearing loss. No visual disturbances. No headaches. No epistaxis.  Pulmonary: No dyspnea. No wheeze. No cough.  CV: No chest pain. No DOUGHERTY. + palpitations. No orthopnea. No PND. No edema.  GI: No BRBPR. No melena. No nausea.  : No hematuria.  Neuro: No weakness. No paresthesia. No syncope.    T(C): 36.8 (09-15-22 @ 13:45), Max: 36.8 (09-15-22 @ 13:45)  HR: 68 (09-15-22 @ 13:45)  BP: 139/69 (09-15-22 @ 13:45)  RR: 15 (09-15-22 @ 13:45)  SpO2: 99% (09-15-22 @ 13:45)  Physical Exam:   General: Awake, alert, speech clear, no acute distress.  Neck: No bruit, no JVD.  Chest: S1, S2. No murmur. CTA.  Abdomen: Soft. Nondistended.  Extremities: No edema. Pulses: DP: Right: 2+, Left: 2+, Radial: Right: 2+, Left: 2+. Good ulnar circulation demonstrated by Tanner test.

## 2022-09-15 ENCOUNTER — TRANSCRIPTION ENCOUNTER (OUTPATIENT)
Age: 57
End: 2022-09-15

## 2022-09-15 ENCOUNTER — OUTPATIENT (OUTPATIENT)
Dept: OUTPATIENT SERVICES | Facility: HOSPITAL | Age: 57
LOS: 1 days | End: 2022-09-15
Payer: COMMERCIAL

## 2022-09-15 VITALS
SYSTOLIC BLOOD PRESSURE: 139 MMHG | TEMPERATURE: 98 F | DIASTOLIC BLOOD PRESSURE: 69 MMHG | OXYGEN SATURATION: 99 % | RESPIRATION RATE: 15 BRPM | HEART RATE: 68 BPM

## 2022-09-15 VITALS
RESPIRATION RATE: 16 BRPM | SYSTOLIC BLOOD PRESSURE: 135 MMHG | HEART RATE: 79 BPM | DIASTOLIC BLOOD PRESSURE: 63 MMHG | OXYGEN SATURATION: 98 %

## 2022-09-15 DIAGNOSIS — I34.0 NONRHEUMATIC MITRAL (VALVE) INSUFFICIENCY: ICD-10-CM

## 2022-09-15 DIAGNOSIS — Z98.890 OTHER SPECIFIED POSTPROCEDURAL STATES: Chronic | ICD-10-CM

## 2022-09-15 DIAGNOSIS — Z90.89 ACQUIRED ABSENCE OF OTHER ORGANS: Chronic | ICD-10-CM

## 2022-09-15 LAB
A1C WITH ESTIMATED AVERAGE GLUCOSE RESULT: 5.1 % — SIGNIFICANT CHANGE UP (ref 4–5.6)
ALBUMIN SERPL ELPH-MCNC: 4.7 G/DL — SIGNIFICANT CHANGE UP (ref 3.3–5.2)
ALP SERPL-CCNC: 49 U/L — SIGNIFICANT CHANGE UP (ref 40–120)
ALT FLD-CCNC: 20 U/L — SIGNIFICANT CHANGE UP
ANION GAP SERPL CALC-SCNC: 13 MMOL/L — SIGNIFICANT CHANGE UP (ref 5–17)
AST SERPL-CCNC: 22 U/L — SIGNIFICANT CHANGE UP
BASOPHILS # BLD AUTO: 0.04 K/UL — SIGNIFICANT CHANGE UP (ref 0–0.2)
BASOPHILS NFR BLD AUTO: 0.4 % — SIGNIFICANT CHANGE UP (ref 0–2)
BILIRUB SERPL-MCNC: 1 MG/DL — SIGNIFICANT CHANGE UP (ref 0.4–2)
BUN SERPL-MCNC: 15.6 MG/DL — SIGNIFICANT CHANGE UP (ref 8–20)
CALCIUM SERPL-MCNC: 9.1 MG/DL — SIGNIFICANT CHANGE UP (ref 8.4–10.5)
CHLORIDE SERPL-SCNC: 101 MMOL/L — SIGNIFICANT CHANGE UP (ref 98–107)
CHOLEST SERPL-MCNC: 163 MG/DL — SIGNIFICANT CHANGE UP
CO2 SERPL-SCNC: 25 MMOL/L — SIGNIFICANT CHANGE UP (ref 22–29)
CREAT SERPL-MCNC: 0.89 MG/DL — SIGNIFICANT CHANGE UP (ref 0.5–1.3)
EGFR: 101 ML/MIN/1.73M2 — SIGNIFICANT CHANGE UP
EOSINOPHIL # BLD AUTO: 0.06 K/UL — SIGNIFICANT CHANGE UP (ref 0–0.5)
EOSINOPHIL NFR BLD AUTO: 0.7 % — SIGNIFICANT CHANGE UP (ref 0–6)
ESTIMATED AVERAGE GLUCOSE: 100 MG/DL — SIGNIFICANT CHANGE UP (ref 68–114)
GLUCOSE SERPL-MCNC: 95 MG/DL — SIGNIFICANT CHANGE UP (ref 70–99)
HCT VFR BLD CALC: 46.2 % — SIGNIFICANT CHANGE UP (ref 39–50)
HDLC SERPL-MCNC: 69 MG/DL — SIGNIFICANT CHANGE UP
HGB BLD-MCNC: 16.1 G/DL — SIGNIFICANT CHANGE UP (ref 13–17)
IMM GRANULOCYTES NFR BLD AUTO: 0.8 % — SIGNIFICANT CHANGE UP (ref 0–0.9)
LIPID PNL WITH DIRECT LDL SERPL: 82 MG/DL — SIGNIFICANT CHANGE UP
LYMPHOCYTES # BLD AUTO: 1.49 K/UL — SIGNIFICANT CHANGE UP (ref 1–3.3)
LYMPHOCYTES # BLD AUTO: 16.5 % — SIGNIFICANT CHANGE UP (ref 13–44)
MAGNESIUM SERPL-MCNC: 2 MG/DL — SIGNIFICANT CHANGE UP (ref 1.6–2.6)
MCHC RBC-ENTMCNC: 31.1 PG — SIGNIFICANT CHANGE UP (ref 27–34)
MCHC RBC-ENTMCNC: 34.8 GM/DL — SIGNIFICANT CHANGE UP (ref 32–36)
MCV RBC AUTO: 89.4 FL — SIGNIFICANT CHANGE UP (ref 80–100)
MONOCYTES # BLD AUTO: 0.62 K/UL — SIGNIFICANT CHANGE UP (ref 0–0.9)
MONOCYTES NFR BLD AUTO: 6.9 % — SIGNIFICANT CHANGE UP (ref 2–14)
NEUTROPHILS # BLD AUTO: 6.73 K/UL — SIGNIFICANT CHANGE UP (ref 1.8–7.4)
NEUTROPHILS NFR BLD AUTO: 74.7 % — SIGNIFICANT CHANGE UP (ref 43–77)
NON HDL CHOLESTEROL: 94 MG/DL — SIGNIFICANT CHANGE UP
PLATELET # BLD AUTO: 230 K/UL — SIGNIFICANT CHANGE UP (ref 150–400)
POTASSIUM SERPL-MCNC: 4.1 MMOL/L — SIGNIFICANT CHANGE UP (ref 3.5–5.3)
POTASSIUM SERPL-SCNC: 4.1 MMOL/L — SIGNIFICANT CHANGE UP (ref 3.5–5.3)
PROT SERPL-MCNC: 7.1 G/DL — SIGNIFICANT CHANGE UP (ref 6.6–8.7)
RBC # BLD: 5.17 M/UL — SIGNIFICANT CHANGE UP (ref 4.2–5.8)
RBC # FLD: 11.9 % — SIGNIFICANT CHANGE UP (ref 10.3–14.5)
SODIUM SERPL-SCNC: 139 MMOL/L — SIGNIFICANT CHANGE UP (ref 135–145)
TRIGL SERPL-MCNC: 59 MG/DL — SIGNIFICANT CHANGE UP
WBC # BLD: 9.01 K/UL — SIGNIFICANT CHANGE UP (ref 3.8–10.5)
WBC # FLD AUTO: 9.01 K/UL — SIGNIFICANT CHANGE UP (ref 3.8–10.5)

## 2022-09-15 PROCEDURE — C1887: CPT

## 2022-09-15 PROCEDURE — 80053 COMPREHEN METABOLIC PANEL: CPT

## 2022-09-15 PROCEDURE — 83735 ASSAY OF MAGNESIUM: CPT

## 2022-09-15 PROCEDURE — C1894: CPT

## 2022-09-15 PROCEDURE — 93458 L HRT ARTERY/VENTRICLE ANGIO: CPT | Mod: 26

## 2022-09-15 PROCEDURE — 99152 MOD SED SAME PHYS/QHP 5/>YRS: CPT

## 2022-09-15 PROCEDURE — 93458 L HRT ARTERY/VENTRICLE ANGIO: CPT

## 2022-09-15 PROCEDURE — 36415 COLL VENOUS BLD VENIPUNCTURE: CPT

## 2022-09-15 PROCEDURE — 83036 HEMOGLOBIN GLYCOSYLATED A1C: CPT

## 2022-09-15 PROCEDURE — C1769: CPT

## 2022-09-15 PROCEDURE — 85025 COMPLETE CBC W/AUTO DIFF WBC: CPT

## 2022-09-15 PROCEDURE — 93005 ELECTROCARDIOGRAM TRACING: CPT

## 2022-09-15 PROCEDURE — 93010 ELECTROCARDIOGRAM REPORT: CPT

## 2022-09-15 PROCEDURE — 80061 LIPID PANEL: CPT

## 2022-09-15 NOTE — DISCHARGE NOTE PROVIDER - HOSPITAL COURSE
57y/o male never smoker with history of HTN [place] [facility] [place] had an echo done that showed a "leaky valve." He followed up with Dr. Chaudhari who did and echo which showed a ruptured chordae and possibly tip of papillary muscle to the P2 and P3 scallops with thickened and redundant mitral valve leaflet resulting in severe and posteriorly directed MR jet. He had a LHC which showed no significant CAD.

## 2022-09-15 NOTE — DISCHARGE NOTE PROVIDER - NSDCCPCAREPLAN_GEN_ALL_CORE_FT
PRINCIPAL DISCHARGE DIAGNOSIS  Diagnosis: Severe mitral valve regurgitation  Assessment and Plan of Treatment: Follow up with Dr. Pollock

## 2022-09-15 NOTE — DISCHARGE NOTE NURSING/CASE MANAGEMENT/SOCIAL WORK - PATIENT PORTAL LINK FT
You can access the FollowMyHealth Patient Portal offered by [facility] by registering at the following website: [email or web address]. By joining [facility]’s FollowMyHealth portal, you will also be able to view your health information using other applications (apps) compatible with our system.

## 2022-09-15 NOTE — DISCHARGE NOTE PROVIDER - CARE PROVIDER_API CALL
Babar Pollock)  Surgery; Thoracic and Cardiac Surgery  [address] [place] [address]  Phone: [phone]  Fax: [phone]  Established Patient  Follow Up Time: Routine    Jesus Chaudhari)  Cardiovascular Disease; Internal Medicine; Interventional Cardiology  [address] [place] [address]  Phone: [phone]  Fax: [phone]  Established Patient  Follow Up Time: 2 weeks

## 2022-09-15 NOTE — DISCHARGE NOTE PROVIDER - NSDCCPTREATMENT_GEN_ALL_CORE_FT
PRINCIPAL PROCEDURE  Procedure: Left heart catheterization  Findings and Treatment:   Coronary Angiography   The coronary circulation is right dominant.    LM   Left main artery: The segment is visually normal in size and structure. The segment is average sized. Angiography shows no disease.    LAD   Left anterior descending artery: The segment is visually normal in size and structure. The segment is medium to large sized. Angiography shows no disease and the vessel wraps around the cardiac apex.  CX   Circumflex: The segment is visually normal in size and structure. The segment is medium sized. Angiography shows no disease.    RCA   Right coronary artery: The segment is visually normal in size and structure. The segment is large, dominant. Angiography shows no disease.    Aorta   The root exhibits normal size.    LV:  Left ventricular function was assessed and demonstrates normal LV wall motion. All remaining scored wall segments are normal. Ejection fraction was visually estimated by LV Gram with a value of 75%. The left ventricle is normal in size. LV to AO pullback was performed and there is no pressure gradient.    Valves   Aortic Valve: There is no aortic valve stenosis.    Mitral Valve: The mitral valve exhibits severe regurgitation.

## 2022-09-15 NOTE — DISCHARGE NOTE NURSING/CASE MANAGEMENT/SOCIAL WORK - NSDCPEFALRISK_GEN_ALL_CORE
For information on Fall & Injury Prevention, visit: [email or web address] OR  [email or web address] OR  https://www.cdc.gov/steadi/patient.html

## 2022-09-15 NOTE — PROGRESS NOTE ADULT - ASSESSMENT
56y Male   Procedure: Left heart catheterization    1. S/P LHC:   ·	Remove radial band at: 4:00PM  ·	Wrist precautions explained.  ·	Medications: Continue current medications.    2. HTN  ·	BP Range Last 24 Hours: 133-139/69-70  ·	Beta Blocker: Metoprolol 25 mg BID  ·	RAASi: Lisinopril 10 mg daily  ·	CCB: N/A  ·	Other: N/A    Discharge Planning:   ·	If OK, discharge home at: 5:00PM  ·	Follow up as an outpatient with: Dr. Pollock and Dr. Chaudhari

## 2022-09-15 NOTE — DISCHARGE NOTE PROVIDER - CARE PROVIDERS DIRECT ADDRESSES
[email or web address]

## 2022-09-15 NOTE — DISCHARGE NOTE PROVIDER - PROVIDER TOKENS
PROVIDER:[TOKEN:[[id number]],FOLLOWUP:[Routine],ESTABLISHEDPATIENT:[T]],PROVIDER:[TOKEN:[[id number]],FOLLOWUP:[2 weeks],ESTABLISHEDPATIENT:[T]]

## 2022-09-15 NOTE — ASU PATIENT PROFILE, ADULT - FALL HARM RISK - UNIVERSAL INTERVENTIONS
Bed in lowest position, wheels locked, appropriate side rails in place/Call bell, personal items and telephone in reach/Instruct patient to call for assistance before getting out of bed or chair/Non-slip footwear when patient is out of bed/Callender to call system/Physically safe environment - no spills, clutter or unnecessary equipment/Purposeful Proactive Rounding/Room/bathroom lighting operational, light cord in reach

## 2022-09-15 NOTE — PROGRESS NOTE ADULT - SUBJECTIVE AND OBJECTIVE BOX
Department of Cardiology                                                                  [facility]/[place] [address] [address]                                                            Telephone: [phone]. Fax:[phone]                                                                           Cardiac Catheterization Note       Subjective:  56y  Male who had a left heart catheterization which showed (official report pending):       LM: No significant CAD       LAD: No significant CAD       LCX: No significant CAD       RCA: No significant CAD         Access: Right radial artery       Hemostasis: Radial band       Total Contrast: 45 mL       Total Heparin:        Antiplatelet Given:    PAST MEDICAL & SURGICAL HISTORY:  Hypertension  Palpitations  Mitral valve stenosis, moderate  Nonrheumatic mitral valve regurgitation  History of tonsillectomy  History of circumcision    FAMILY HISTORY:  Family history of atrial fibrillation (Mother)    Home Medications:  Aspir 81 oral delayed release tablet: 1 tab(s) orally once a day (15 Sep 2022 13:22)  lisinopril 10 mg oral tablet: 1 tab(s) orally once a day (15 Sep 2022 13:22)  Metoprolol Tartrate 25 mg oral tablet: 1 tab(s) orally 2 times a day (15 Sep 2022 13:22)    HPI: 57y/o male never smoker with history of HTN who [place] [facility] [place] and had an echo done that showed a "leaky valve." He followed up with Dr. Chaudhari who did and echo which showed a ruptured chordae and possibly tip of papillary muscle to the P2 and P3 scallops with thickened and redundant mitral valve leaflet resulting in severe and posteriorly directed MR jet.     General: No fatigue, no fevers/chills  Respiratory: No dyspnea, no cough, no wheeze  CV: No chest pain, no palpitations  Abd: No nausea  Neuro: No headache, no dizziness    No Known Allergies    Objective:  Vital Signs Last 24 Hrs  T(C): 36.8 (15 Sep 2022 13:45), Max: 36.8 (15 Sep 2022 13:45)  T(F): 98.2 (15 Sep 2022 13:45), Max: 98.2 (15 Sep 2022 13:45)  HR: 73 (15 Sep 2022 15:05) (68 - 73)  BP: 133/70 (15 Sep 2022 15:05) (133/70 - 139/69)  RR: 15 (15 Sep 2022 15:05) (15 - 15)  SpO2: 97% (15 Sep 2022 15:05) (97% - 99%)    Parameters below as of 15 Sep 2022 15:05  Patient On (Oxygen Delivery Method): room air    CM: SR  Neuro: A&OX3, CN 2-12 intact  HEENT: NC, AT  Lungs: CTA B/L  CV: S1, S2, no murmur, RRR  Abd: Soft  Extremity: Right radial band: no bleeding, fingers warm with good cap refil                          16.1   9.01  )-----------( 230      ( 15 Sep 2022 13:30 )             46.2     09-15    139  |  101  |  15.6  ----------------------------<  95  4.1   |  25.0  |  0.89    Ca    9.1      15 Sep 2022 13:30  Mg     2.0     09-15    TPro  7.1  /  Alb  4.7  /  TBili  1.0  /  DBili  x   /  AST  22  /  ALT  20  /  AlkPhos  49  09-15                                                                                Department of Cardiology                                                                  [facility]/[place] [address]  [place]-[address]                                                            Telephone: [phone]. Fax:[phone]                                                                           Cardiac Catheterization Note       Subjective:  56y  Male who had a left heart catheterization which showed:  Coronary Angiography   ·	The coronary circulation is right dominant.    LM   ·	Left main artery: The segment is visually normal in size and structure. The segment is average sized. Angiography shows no disease.    LAD   ·	Left anterior descending artery: The segment is visually normal in size and structure. The segment is medium to large sized. Angiography shows no disease and the vessel wraps around the cardiac apex.  CX   ·	Circumflex: The segment is visually normal in size and structure. The segment is medium sized. Angiography shows no disease.    RCA   ·	Right coronary artery: The segment is visually normal in size and structure. The segment is large, dominant. Angiography shows no disease.    Aorta   ·	The root exhibits normal size.    LV:  ·	Left ventricular function was assessed and demonstrates normal LV wall motion. All remaining scored wall segments are normal. Ejection fraction was visually estimated by LV Gram with a value of 75%. The left ventricle is normal in size. LV to AO pullback was performed and there is no pressure gradient.    Valves   ·	Aortic Valve: There is no aortic valve stenosis.    ·	Mitral Valve: The mitral valve exhibits severe regurgitation.         Access: Right radial artery       Hemostasis: Radial band       Total Contrast: 45 mL Omnipaque       Total Heparin: N/A       Antiplatelet Given: N/A    PAST MEDICAL & SURGICAL HISTORY:  Hypertension  Palpitations  Mitral valve stenosis, moderate  Nonrheumatic mitral valve regurgitation  History of tonsillectomy  History of circumcision    FAMILY HISTORY:  Family history of atrial fibrillation (Mother)    Home Medications:  Aspir 81 oral delayed release tablet: 1 tab(s) orally once a day (15 Sep 2022 13:22)  lisinopril 10 mg oral tablet: 1 tab(s) orally once a day (15 Sep 2022 13:22)  Metoprolol Tartrate 25 mg oral tablet: 1 tab(s) orally 2 times a day (15 Sep 2022 13:22)    HPI: 57y/o male never smoker with history of HTN who went to [facility] for palpitations and had an echo done that showed a "leaky valve." He followed up with Dr. Chaudhari who did and echo which showed a ruptured chordae and possibly tip of papillary muscle to the P2 and P3 scallops with thickened and redundant mitral valve leaflet resulting in severe and posteriorly directed MR jet.     General: No fatigue, no fevers/chills  Respiratory: No dyspnea, no cough, no wheeze  CV: No chest pain, no palpitations  Abd: No nausea  Neuro: No headache, no dizziness    No Known Allergies    Objective:  Vital Signs Last 24 Hrs  T(C): 36.8 (15 Sep 2022 13:45), Max: 36.8 (15 Sep 2022 13:45)  T(F): 98.2 (15 Sep 2022 13:45), Max: 98.2 (15 Sep 2022 13:45)  HR: 73 (15 Sep 2022 15:05) (68 - 73)  BP: 133/70 (15 Sep 2022 15:05) (133/70 - 139/69)  RR: 15 (15 Sep 2022 15:05) (15 - 15)  SpO2: 97% (15 Sep 2022 15:05) (97% - 99%)    Parameters below as of 15 Sep 2022 15:05  Patient On (Oxygen Delivery Method): room air    CM: SR  Neuro: A&OX3, CN 2-12 intact  HEENT: NC, AT  Lungs: CTA B/L  CV: S1, S2, no murmur, RRR  Abd: Soft  Extremity: Right radial band: no bleeding, fingers warm with good cap refil                          16.1   9.01  )-----------( 230      ( 15 Sep 2022 13:30 )             46.2     09-15    139  |  101  |  15.6  ----------------------------<  95  4.1   |  25.0  |  0.89    Ca    9.1      15 Sep 2022 13:30  Mg     2.0     09-15    TPro  7.1  /  Alb  4.7  /  TBili  1.0  /  DBili  x   /  AST  22  /  ALT  20  /  AlkPhos  49  09-15

## 2022-09-15 NOTE — DISCHARGE NOTE PROVIDER - NSDCFUSCHEDAPPT_GEN_ALL_CORE_FT
Babar Pollock  [facility] Physician [facility]  CTSURG [address]  Scheduled Appointment: 09/20/2022    [facility]  PULMMED [address]  Scheduled Appointment: 09/22/2022    Jesus Chaudhari  [facility]  CARDIOLOGY [address]  Scheduled Appointment: 11/01/2022

## 2022-09-20 ENCOUNTER — APPOINTMENT (OUTPATIENT)
Dept: CARDIOTHORACIC SURGERY | Facility: CLINIC | Age: 57
End: 2022-09-20

## 2022-09-20 VITALS
HEART RATE: 55 BPM | WEIGHT: 182 LBS | HEIGHT: 76 IN | BODY MASS INDEX: 22.16 KG/M2 | RESPIRATION RATE: 16 BRPM | SYSTOLIC BLOOD PRESSURE: 135 MMHG | OXYGEN SATURATION: 98 % | TEMPERATURE: 98 F | DIASTOLIC BLOOD PRESSURE: 77 MMHG

## 2022-09-20 LAB — SARS-COV-2 N GENE NPH QL NAA+PROBE: NOT DETECTED

## 2022-09-20 PROCEDURE — 99215 OFFICE O/P EST HI 40 MIN: CPT

## 2022-09-22 ENCOUNTER — APPOINTMENT (OUTPATIENT)
Dept: PULMONOLOGY | Facility: CLINIC | Age: 57
End: 2022-09-22

## 2022-09-22 VITALS — WEIGHT: 188 LBS | BODY MASS INDEX: 23.87 KG/M2 | HEIGHT: 74.5 IN

## 2022-09-22 DIAGNOSIS — Z01.818 ENCOUNTER FOR OTHER PREPROCEDURAL EXAMINATION: ICD-10-CM

## 2022-09-22 PROCEDURE — 94010 BREATHING CAPACITY TEST: CPT

## 2022-09-22 PROCEDURE — 94729 DIFFUSING CAPACITY: CPT

## 2022-09-22 PROCEDURE — 94727 GAS DIL/WSHOT DETER LNG VOL: CPT

## 2022-09-22 PROCEDURE — 85018 HEMOGLOBIN: CPT | Mod: QW

## 2022-09-23 PROBLEM — I05.0 RHEUMATIC MITRAL STENOSIS: Chronic | Status: ACTIVE | Noted: 2022-09-14

## 2022-09-23 PROBLEM — I34.0 NONRHEUMATIC MITRAL (VALVE) INSUFFICIENCY: Chronic | Status: ACTIVE | Noted: 2022-09-14

## 2022-09-23 NOTE — ASSESSMENT
[FreeTextEntry1] : Mr Corcoran returns to discuss recent Transesophageal Echocardiogram imaging and cardiac catheterization testing and develop a surgical plan of care. On imaging there is anterior leaflet prolapse. This is typically more challenging to repair and we discussed that valve replacement may have to occur.  We discussed that should the valve require replacement Warfarin will be required for 3 months. \par \par Risks benefits and alternatives to transcatheter mitral valve repair replacement were discussed with the patient in detail. Risks discussed included, but not limited to infection, bleeding, myocardial infarction, cerebrovascular accident, renal failure, vascular injury requiring intervention, cardiac rupture, and death.\par \par The procedure, hospital stay and recovery was discussed in detail. All risks, benefits, and alternatives discussed at length with patient. All questions addressed. Patient would like to proceed with surgical intervention as discussed.\par \par PLAN:\par - Mitral Valve Repair Possible Replacement\par \par \par \par \par \par \par Uriah SHANNON NP am scribing for and in the presence of Dr. Pollock the following sections HISTORY OF PRESENT ILLNESS, PAST MEDICAL/FAMILY/SOCIAL HISTORY; REVIEW OF SYSTEMS; VITAL SIGNS; PHYSICAL EXAM; DISPOSITION.\par \par "I personally performed the services described in the documentation, reviewed the documentation recorded by the scribe in my presence and accurately and completely records my words and actions."\par \par

## 2022-09-23 NOTE — CONSULT LETTER
[Dear  ___] : Dear  [unfilled], [Courtesy Letter:] : I had the pleasure of seeing your patient, [unfilled], in my office today. [Please see my note below.] : Please see my note below. [Consult Closing:] : Thank you very much for allowing me to participate in the care of this patient.  If you have any questions, please do not hesitate to contact me. [Sincerely,] : Sincerely, [FreeTextEntry2] : Shaji Chaudhari MD [FreeTextEntry3] : Babar Pollock MD\par  of Cardiothoracic Surgery\par [facility]\par [address] \[place] [address]\par [phone]\par

## 2022-09-23 NOTE — PHYSICAL EXAM
[Sclera] : the sclera and conjunctiva were normal [Neck Appearance] : the appearance of the neck was normal [Respiration, Rhythm And Depth] : normal respiratory rhythm and effort [Heart Rate And Rhythm] : heart rate was normal and rhythm regular [Examination Of The Chest] : the chest was normal in appearance [2+] : left 2+ [Abnormal Walk] : normal gait [Motor Tone] : muscle strength and tone were normal [Skin Color & Pigmentation] : normal skin color and pigmentation [Sensation] : the sensory exam was normal to light touch and pinprick [Motor Exam] : the motor exam was normal [Oriented To Time, Place, And Person] : oriented to person, place, and time [Impaired Insight] : insight and judgment were intact [Right Carotid Bruit] : no bruit heard over the right carotid [Left Carotid Bruit] : no bruit heard over the left carotid

## 2022-09-23 NOTE — REVIEW OF SYSTEMS
[Feeling Tired] : feeling tired [Negative] : Heme/Lymph [Feeling Poorly] : not feeling poorly [Chest Pain] : no chest pain [Palpitations] : no palpitations [Shortness Of Breath] : no shortness of breath [SOB on Exertion] : no shortness of breath during exertion

## 2022-09-23 NOTE — HISTORY OF PRESENT ILLNESS
[FreeTextEntry1] : Mr. CORCORAN is a 56 year old male referred by Dr. Chaudhari who presents for follow up after Cardiac Cath on 9/15/22. His past medical history includes tonsillectomy. 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 \par \par He was evaluated at [facility] January 6th for palpitations and shortness of breath. An echocardiogram was performed at that time revealing a "leaky valve". He had followed up with Cardiology post discharge and echocardiogram was performed revealing mitral valve dysfunction. \par \par Mr Corcoran presents to the office today to discuss possible mitral valve intervention.

## 2022-09-23 NOTE — DATA REVIEWED
[FreeTextEntry1] : Left Heart Cath at [facility] 9/15/22:\par Coronary Angiography-The coronary circulation is right dominant.  \par LM \par Left main artery: The segment is visually normal in size and\par structure. The segment is average sized. Angiography shows no\par disease.  \par LAD \par Left anterior descending artery: The segment is visually normal in\par size and structure. The segment is medium to large sized.\par Angiography shows no disease and the vessel wraps around the cardiac\par apex.\par CX \par Circumflex: The segment is visually normal in size and structure. The\par segment is medium sized. Angiography shows no\par disease.  \par RCA \par Right coronary artery: The segment is visually normal in size and\par structure. The segment is large, dominant. Angiography\par shows no disease.  \par Aorta \par The root exhibits normal size.  \par Left Heart Cath \par Left ventricular function was assessed and demonstrates normal LV wall\par motion. All remaining scored wall segments are\par normal. Ejection fraction was visually estimated by LV Gram with a\par value of 75%. The left ventricle is normal in size. LV to AO\par pullback was performed and there is no pressure gradient.  \par Valves \par Aortic Valve: There is no aortic valve stenosis.  \par Mitral Valve: The mitral valve exhibits severe regurgitation.

## 2022-09-28 ENCOUNTER — APPOINTMENT (OUTPATIENT)
Dept: CARDIOLOGY | Facility: CLINIC | Age: 57
End: 2022-09-28

## 2022-09-29 ENCOUNTER — RX RENEWAL (OUTPATIENT)
Age: 57
End: 2022-09-29

## 2022-10-21 ENCOUNTER — OUTPATIENT (OUTPATIENT)
Dept: OUTPATIENT SERVICES | Facility: HOSPITAL | Age: 57
LOS: 1 days | End: 2022-10-21
Payer: COMMERCIAL

## 2022-10-21 ENCOUNTER — RESULT REVIEW (OUTPATIENT)
Age: 57
End: 2022-10-21

## 2022-10-21 VITALS
HEIGHT: 76 IN | WEIGHT: 192.02 LBS | HEART RATE: 58 BPM | OXYGEN SATURATION: 99 % | SYSTOLIC BLOOD PRESSURE: 120 MMHG | DIASTOLIC BLOOD PRESSURE: 70 MMHG | TEMPERATURE: 98 F | RESPIRATION RATE: 20 BRPM

## 2022-10-21 DIAGNOSIS — Z29.9 ENCOUNTER FOR PROPHYLACTIC MEASURES, UNSPECIFIED: ICD-10-CM

## 2022-10-21 DIAGNOSIS — Z01.818 ENCOUNTER FOR OTHER PREPROCEDURAL EXAMINATION: ICD-10-CM

## 2022-10-21 DIAGNOSIS — Z98.890 OTHER SPECIFIED POSTPROCEDURAL STATES: Chronic | ICD-10-CM

## 2022-10-21 DIAGNOSIS — I34.0 NONRHEUMATIC MITRAL (VALVE) INSUFFICIENCY: ICD-10-CM

## 2022-10-21 DIAGNOSIS — Z90.89 ACQUIRED ABSENCE OF OTHER ORGANS: Chronic | ICD-10-CM

## 2022-10-21 LAB
A1C WITH ESTIMATED AVERAGE GLUCOSE RESULT: 5.2 % — SIGNIFICANT CHANGE UP (ref 4–5.6)
ALBUMIN SERPL ELPH-MCNC: 4.2 G/DL — SIGNIFICANT CHANGE UP (ref 3.3–5.2)
ALP SERPL-CCNC: 47 U/L — SIGNIFICANT CHANGE UP (ref 40–120)
ALT FLD-CCNC: 21 U/L — SIGNIFICANT CHANGE UP
ANION GAP SERPL CALC-SCNC: 8 MMOL/L — SIGNIFICANT CHANGE UP (ref 5–17)
APPEARANCE UR: CLEAR — SIGNIFICANT CHANGE UP
APTT BLD: 35.9 SEC — HIGH (ref 27.5–35.5)
AST SERPL-CCNC: 23 U/L — SIGNIFICANT CHANGE UP
BACTERIA # UR AUTO: ABNORMAL
BASOPHILS # BLD AUTO: 0.05 K/UL — SIGNIFICANT CHANGE UP (ref 0–0.2)
BASOPHILS NFR BLD AUTO: 0.7 % — SIGNIFICANT CHANGE UP (ref 0–2)
BILIRUB SERPL-MCNC: 0.7 MG/DL — SIGNIFICANT CHANGE UP (ref 0.4–2)
BILIRUB UR-MCNC: NEGATIVE — SIGNIFICANT CHANGE UP
BLD GP AB SCN SERPL QL: SIGNIFICANT CHANGE UP
BUN SERPL-MCNC: 15.1 MG/DL — SIGNIFICANT CHANGE UP (ref 8–20)
CALCIUM SERPL-MCNC: 9.1 MG/DL — SIGNIFICANT CHANGE UP (ref 8.4–10.5)
CHLORIDE SERPL-SCNC: 106 MMOL/L — SIGNIFICANT CHANGE UP (ref 98–107)
CO2 SERPL-SCNC: 28 MMOL/L — SIGNIFICANT CHANGE UP (ref 22–29)
COLOR SPEC: YELLOW — SIGNIFICANT CHANGE UP
CREAT SERPL-MCNC: 0.95 MG/DL — SIGNIFICANT CHANGE UP (ref 0.5–1.3)
DIFF PNL FLD: NEGATIVE — SIGNIFICANT CHANGE UP
EGFR: 93 ML/MIN/1.73M2 — SIGNIFICANT CHANGE UP
EOSINOPHIL # BLD AUTO: 0.15 K/UL — SIGNIFICANT CHANGE UP (ref 0–0.5)
EOSINOPHIL NFR BLD AUTO: 2 % — SIGNIFICANT CHANGE UP (ref 0–6)
EPI CELLS # UR: SIGNIFICANT CHANGE UP
ESTIMATED AVERAGE GLUCOSE: 103 MG/DL — SIGNIFICANT CHANGE UP (ref 68–114)
GLUCOSE SERPL-MCNC: 79 MG/DL — SIGNIFICANT CHANGE UP (ref 70–99)
GLUCOSE UR QL: NEGATIVE MG/DL — SIGNIFICANT CHANGE UP
HCT VFR BLD CALC: 45.2 % — SIGNIFICANT CHANGE UP (ref 39–50)
HGB BLD-MCNC: 15.4 G/DL — SIGNIFICANT CHANGE UP (ref 13–17)
IMM GRANULOCYTES NFR BLD AUTO: 1.1 % — HIGH (ref 0–0.9)
INR BLD: 1.03 RATIO — SIGNIFICANT CHANGE UP (ref 0.88–1.16)
KETONES UR-MCNC: NEGATIVE — SIGNIFICANT CHANGE UP
LEUKOCYTE ESTERASE UR-ACNC: ABNORMAL
LYMPHOCYTES # BLD AUTO: 1.33 K/UL — SIGNIFICANT CHANGE UP (ref 1–3.3)
LYMPHOCYTES # BLD AUTO: 18.2 % — SIGNIFICANT CHANGE UP (ref 13–44)
MCHC RBC-ENTMCNC: 31.1 PG — SIGNIFICANT CHANGE UP (ref 27–34)
MCHC RBC-ENTMCNC: 34.1 GM/DL — SIGNIFICANT CHANGE UP (ref 32–36)
MCV RBC AUTO: 91.3 FL — SIGNIFICANT CHANGE UP (ref 80–100)
MONOCYTES # BLD AUTO: 0.6 K/UL — SIGNIFICANT CHANGE UP (ref 0–0.9)
MONOCYTES NFR BLD AUTO: 8.2 % — SIGNIFICANT CHANGE UP (ref 2–14)
MRSA PCR RESULT.: SIGNIFICANT CHANGE UP
NEUTROPHILS # BLD AUTO: 5.11 K/UL — SIGNIFICANT CHANGE UP (ref 1.8–7.4)
NEUTROPHILS NFR BLD AUTO: 69.8 % — SIGNIFICANT CHANGE UP (ref 43–77)
NITRITE UR-MCNC: NEGATIVE — SIGNIFICANT CHANGE UP
NT-PROBNP SERPL-SCNC: 120 PG/ML — SIGNIFICANT CHANGE UP (ref 0–300)
PH UR: 6 — SIGNIFICANT CHANGE UP (ref 5–8)
PLATELET # BLD AUTO: 267 K/UL — SIGNIFICANT CHANGE UP (ref 150–400)
POTASSIUM SERPL-MCNC: 4.3 MMOL/L — SIGNIFICANT CHANGE UP (ref 3.5–5.3)
POTASSIUM SERPL-SCNC: 4.3 MMOL/L — SIGNIFICANT CHANGE UP (ref 3.5–5.3)
PREALB SERPL-MCNC: 27 MG/DL — SIGNIFICANT CHANGE UP (ref 18–38)
PROT SERPL-MCNC: 6.9 G/DL — SIGNIFICANT CHANGE UP (ref 6.6–8.7)
PROT UR-MCNC: NEGATIVE — SIGNIFICANT CHANGE UP
PROTHROM AB SERPL-ACNC: 11.9 SEC — SIGNIFICANT CHANGE UP (ref 10.5–13.4)
RBC # BLD: 4.95 M/UL — SIGNIFICANT CHANGE UP (ref 4.2–5.8)
RBC # FLD: 12.2 % — SIGNIFICANT CHANGE UP (ref 10.3–14.5)
RBC CASTS # UR COMP ASSIST: NEGATIVE /HPF — SIGNIFICANT CHANGE UP (ref 0–4)
S AUREUS DNA NOSE QL NAA+PROBE: DETECTED
SODIUM SERPL-SCNC: 142 MMOL/L — SIGNIFICANT CHANGE UP (ref 135–145)
SP GR SPEC: 1.02 — SIGNIFICANT CHANGE UP (ref 1.01–1.02)
T3 SERPL-MCNC: 101 NG/DL — SIGNIFICANT CHANGE UP (ref 80–200)
T4 AB SER-ACNC: 7.2 UG/DL — SIGNIFICANT CHANGE UP (ref 4.5–12)
TSH SERPL-MCNC: 1.99 UIU/ML — SIGNIFICANT CHANGE UP (ref 0.27–4.2)
UROBILINOGEN FLD QL: NEGATIVE MG/DL — SIGNIFICANT CHANGE UP
WBC # BLD: 7.32 K/UL — SIGNIFICANT CHANGE UP (ref 3.8–10.5)
WBC # FLD AUTO: 7.32 K/UL — SIGNIFICANT CHANGE UP (ref 3.8–10.5)
WBC UR QL: SIGNIFICANT CHANGE UP /HPF (ref 0–5)

## 2022-10-21 PROCEDURE — 93010 ELECTROCARDIOGRAM REPORT: CPT

## 2022-10-21 PROCEDURE — G0463: CPT

## 2022-10-21 PROCEDURE — 93005 ELECTROCARDIOGRAM TRACING: CPT

## 2022-10-21 PROCEDURE — 71046 X-RAY EXAM CHEST 2 VIEWS: CPT | Mod: 26

## 2022-10-21 PROCEDURE — 71046 X-RAY EXAM CHEST 2 VIEWS: CPT

## 2022-10-21 NOTE — H&P PST ADULT - ASSESSMENT
Pt is a 57 years old male seen today pre-op for Mitral valve repair, possible replace. Pt recently presented to [facility]  for palpitations and shortness of breath. An echocardiogram was performed at that time revealing a "leaky valve". Pt  had followed up with Cardiology Dr. Chaudhari  post discharge and echocardiogram was performed revealing mitral valve dysfunction. Pt had Cardiac Cath on 9/15/22. Pt endorsed he has not seen a physician for many years. Pt  today report occasional episodes of palpitations, denies chest pain, peripheral edema, shortness of breath, exertional dyspnea, orthopnea and dizziness, lightheadedness, syncope fever/chills  and any other related issues. Pt seen today for a scheduled surgery on 11/10/2022 with Dr. Pollock.. Surgery protocol reviewed with Pt today. Pt instructed to stop Aspirin 3 days prior to this procedure as per Dr. Pollock's office instructions, Pt provided instructions for COVID PCR test prior to this procedure   CAPRINI VTE 2.0 SCORE [CLOT updated 2019]    AGE RELATED RISK FACTORS                                                       MOBILITY RELATED FACTORS  [x ] Age 41-60 years                                            (1 Point)                    [ ] Bed rest                                                        (1 Point)  [ ] Age: 61-74 years                                           (2 Points)                  [ ] Plaster cast                                                   (2 Points)  [ ] Age= 75 years                                              (3 Points)                    [ ] Bed bound for more than 72 hours                 (2 Points)    DISEASE RELATED RISK FACTORS                                               GENDER SPECIFIC FACTORS  [ ] Edema in the lower extremities                       (1 Point)              [ ] Pregnancy                                                     (1 Point)  [ ] Varicose veins                                               (1 Point)                     [ ] Post-partum < 6 weeks                                   (1 Point)             [ x] BMI > 25 Kg/m2                                            (1 Point)                     [ ] Hormonal therapy  or oral contraception          (1 Point)                 [ ] Sepsis (in the previous month)                        (1 Point)               [ ] History of pregnancy complications                 (1 point)  [ ] Pneumonia or serious lung disease                                               [ ] Unexplained or recurrent                     (1 Point)           (in the previous month)                               (1 Point)  [ ] Abnormal pulmonary function test                     (1 Point)                 SURGERY RELATED RISK FACTORS  [ ] Acute myocardial infarction                              (1 Point)               [ ]  Section                                             (1 Point)  [ ] Congestive heart failure (in the previous month)  (1 Point)      [ ] Minor surgery                                                  (1 Point)   [ ] Inflammatory bowel disease                             (1 Point)               [ ] Arthroscopic surgery                                        (2 Points)  [ ] Central venous access                                      (2 Points)                [x ] General surgery lasting more than 45 minutes (2 points)  [ ] Malignancy- Present or previous                   (2 Points)                [ ] Elective arthroplasty                                         (5 points)    [ ] Stroke (in the previous month)                          (5 Points)                                                                                                                                                           HEMATOLOGY RELATED FACTORS                                                 TRAUMA RELATED RISK FACTORS  [ ] Prior episodes of VTE                                     (3 Points)                [ ] Fracture of the hip, pelvis, or leg                       (5 Points)  [ ] Positive family history for VTE                         (3 Points)             [ ] Acute spinal cord injury (in the previous month)  (5 Points)  [ ] Prothrombin 96615 A                                     (3 Points)               [ ] Paralysis  (less than 1 month)                             (5 Points)  [ ] Factor V Leiden                                             (3 Points)                  [ ] Multiple Trauma within 1 month                        (5 Points)  [ ] Lupus anticoagulants                                     (3 Points)                                                           [ ] Anticardiolipin antibodies                               (3 Points)                                                       [ ] High homocysteine in the blood                      (3 Points)                                             [ ] Other congenital or acquired thrombophilia      (3 Points)                                                [ ] Heparin induced thrombocytopenia                  (3 Points)                                     Total Score [    4      ]  OPIOID RISK TOOL    LORETTA EACH BOX THAT APPLIES AND ADD TOTALS AT THE END    FAMILY HISTORY OF SUBSTANCE ABUSE                 FEMALE         MALE                                                Alcohol                             [  ]1 pt          [  ]3pts                                               Illegal Durgs                     [  ]2 pts        [  ]3pts                                               Rx Drugs                           [  ]4 pts        [  ]4 pts    PERSONAL HISTORY OF SUBSTANCE ABUSE                                                                                          Alcohol                             [  ]3 pts       [  ]3 pts                                               Illegal Drugs                     [  ]4 pts        [  ]4 pts                                               Rx Drugs                           [  ]5 pts        [  ]5 pts    AGE BETWEEN 16-45 YEARS                                      [  ]1 pt         [  ]1 pt    HISTORY OF PREADOLESCENT   SEXUAL ABUSE                                                             [  ]3 pts        [  ]0pts    PSYCHOLOGICAL DISEASE                     ADD, OCD, Bipolar, Schizophrenia        [  ]2 pts         [  ]2 pts                      Depression                                               [  ]1 pt           [  ]1 pt           SCORING TOTAL   (add numbers and type here)              (*0*)                                     A score of 3 or lower indicated LOW risk for future opioid abuse  A score of 4 to 7 indicated moderate risk for future opioid abuse  A score of 8 or higher indicates a high risk for opioid abuse

## 2022-10-21 NOTE — H&P PST ADULT - LAB RESULTS AND INTERPRETATION
10/21/2022@ 8PM: Email sent to Dr. Pollock's office regarding abnormal lab results Pt positive MSSA 10/21/2022@ 8PM: Email sent to Dr. Pollock's office regarding abnormal lab results Pt positive MSSA  10/27/2022: Pt called today with MSSA results and Mupirocin ointment instruction provided. Treatment sent to Pharmacy on file. Dr. Pollock's office notified, Spoke with MALIHA Hagan

## 2022-10-21 NOTE — H&P PST ADULT - ATTENDING COMMENTS
risks, benefits, and alternatives of surgery was discussed with patient. I discussed possibility of valve replacement as well as valve choices should replacement be necessary. Plan is to replace the valve with bioprosthetic valve.

## 2022-10-21 NOTE — H&P PST ADULT - MUSCULOSKELETAL
negative back exam normal/ROM intact/normal gait/strength 5/5 bilateral upper extremities/strength 5/5 bilateral lower extremities/back exam

## 2022-10-21 NOTE — H&P PST ADULT - HISTORY OF PRESENT ILLNESS
Pt is a 57 years old male seen today pre-op for Mitral valve repair, possible replace. Pt recently presented to [facility] January 6th for palpitations and shortness of breath. An echocardiogram was performed at that time revealing a "leaky valve". Pt  had followed up with Cardiology Dr. Chaudhari  post discharge and echocardiogram was performed revealing mitral valve dysfunction. Pt had Cardiac Cath on 9/15/22. Pt endorsed he has not seen a physician for many years. Pt  today report occasional episodes of palpitations, denies chest pain, peripheral edema, shortness of breath, exertional dyspnea, orthopnea and dizziness, lightheadedness, syncope fever/chills  and any other related issues. Pt seen today for a scheduled surgery on 11/10/2022 with Dr. Pollock.

## 2022-10-21 NOTE — H&P PST ADULT - NSICDXFAMILYHX_GEN_ALL_CORE_FT
FAMILY HISTORY:  Father  Still living? No  FHx: esophageal cancer, Age at diagnosis: Age Unknown    Mother  Still living? Unknown  Family history of atrial fibrillation, Age at diagnosis: Age Unknown

## 2022-10-22 LAB
CULTURE RESULTS: SIGNIFICANT CHANGE UP
SPECIMEN SOURCE: SIGNIFICANT CHANGE UP

## 2022-10-26 ENCOUNTER — RX RENEWAL (OUTPATIENT)
Age: 57
End: 2022-10-26

## 2022-10-27 RX ORDER — MUPIROCIN 20 MG/G
1 OINTMENT TOPICAL
Qty: 1 | Refills: 0
Start: 2022-10-27 | End: 2022-10-31

## 2022-11-01 ENCOUNTER — NON-APPOINTMENT (OUTPATIENT)
Age: 57
End: 2022-11-01

## 2022-11-01 ENCOUNTER — APPOINTMENT (OUTPATIENT)
Dept: CARDIOLOGY | Facility: CLINIC | Age: 57
End: 2022-11-01

## 2022-11-01 VITALS
HEIGHT: 74.5 IN | BODY MASS INDEX: 24.13 KG/M2 | OXYGEN SATURATION: 97 % | DIASTOLIC BLOOD PRESSURE: 72 MMHG | TEMPERATURE: 99 F | HEART RATE: 67 BPM | WEIGHT: 190 LBS | SYSTOLIC BLOOD PRESSURE: 112 MMHG

## 2022-11-01 PROCEDURE — 93000 ELECTROCARDIOGRAM COMPLETE: CPT

## 2022-11-01 PROCEDURE — 99213 OFFICE O/P EST LOW 20 MIN: CPT | Mod: 25

## 2022-11-01 NOTE — PHYSICAL EXAM

## 2022-11-01 NOTE — ASSESSMENT
[FreeTextEntry1] : ECG performed today at the office revealed a NSR 56 bpm, with normal AQRS, NE, QRS and QTc.\par

## 2022-11-01 NOTE — DISCUSSION/SUMMARY
[FreeTextEntry1] : Mr. JAVI TELLEZ is a 57 year male with recent onset of palpitations (SVT?) and newly discovered severe mitral regurgitation. Had a TTE and LOULOU that revealed severe eccentric MR with a regurgitant volume of 190 mL an LVEF=58%. Marantic endocarditis? Has not undergone evaluation for possible etiologies. Had a cardiac cath and is scheduled for MVR (repair vs replacement) on 11/10/2022. \par He also has an abdominal bruit.  The LOULOU that was performed revealed normal ascending and descending aorta. CT of the aorta revealed a mildly dilated aortic root (4.3cm). Rest of the aorta is normal. \par Holter revealed two runs of NSVT and SVT. \par At the present time He is completely asymptomatic.\par I have recommended to continue the same medications.\par Routine follow up in 3 months\par

## 2022-11-01 NOTE — HISTORY OF PRESENT ILLNESS
[FreeTextEntry1] : 57 yo man, , no children, works as a physicist for a journal. [place] [facility] [place]6/[place] for palpitations, fast, regular, last 1-2 minutes, starts and ends suddenly, not associated with SOB, CP, dizziness or other symptoms. Not associated with coffee or caffeine ingestion. At , he was on obs for 24 hours and had an echo that revealed a "leaky valve". Discharged home on meds.\par Had an TTE that revealed a thickened anterior mitral valve leaflet. Partial flail A2 with severe eccentric MR. The LA is moderately enlarged. Possible marantic endocarditis?\par On 5/24/2022 he had a LOULOU that revealed ruptured chordae with thickened and redundant mitral valve leaflet. Severe MR.  Mild TR. LVEF=57%. \par Cardiac cath in Sept 15, 2022 revealed normal LVEF=75% with severe MR +4. Normal coronary arteries. \par Will undergo MVR (repair of replacement) on 11/10/2022 with Dr Pollock. \par Today for follow up. At the present time patient is completely asymptomatic and denies CP, SOB, orthopnea or PND. Denies palpitations, dizziness or ankle swelling.\par No risk factors.\par No surgeries.\par Doesnt smoke\par Occasional alcohol\par Denies the use of illicit drugs\par Received the COVID 19 vaccine\par No family history of heart disease.

## 2022-11-09 NOTE — PATIENT PROFILE ADULT - FALL HARM RISK - UNIVERSAL INTERVENTIONS
Bed in lowest position, wheels locked, appropriate side rails in place/Call bell, personal items and telephone in reach/Instruct patient to call for assistance before getting out of bed or chair/Non-slip footwear when patient is out of bed/[place] to call system/Physically safe environment - no spills, clutter or unnecessary equipment/Purposeful Proactive Rounding/Room/bathroom lighting operational, light cord in reach

## 2022-11-10 ENCOUNTER — APPOINTMENT (OUTPATIENT)
Dept: CARDIOTHORACIC SURGERY | Facility: HOSPITAL | Age: 57
End: 2022-11-10

## 2022-11-10 ENCOUNTER — INPATIENT (INPATIENT)
Facility: HOSPITAL | Age: 57
LOS: 4 days | Discharge: HOME | DRG: 219 | End: 2022-11-15
Attending: THORACIC SURGERY (CARDIOTHORACIC VASCULAR SURGERY) | Admitting: THORACIC SURGERY (CARDIOTHORACIC VASCULAR SURGERY)
Payer: COMMERCIAL

## 2022-11-10 ENCOUNTER — TRANSCRIPTION ENCOUNTER (OUTPATIENT)
Age: 57
End: 2022-11-10

## 2022-11-10 ENCOUNTER — RESULT REVIEW (OUTPATIENT)
Age: 57
End: 2022-11-10

## 2022-11-10 VITALS
DIASTOLIC BLOOD PRESSURE: 80 MMHG | SYSTOLIC BLOOD PRESSURE: 134 MMHG | TEMPERATURE: 98 F | RESPIRATION RATE: 15 BRPM | WEIGHT: 179.9 LBS | HEART RATE: 60 BPM | OXYGEN SATURATION: 100 % | HEIGHT: 76 IN

## 2022-11-10 DIAGNOSIS — Z90.89 ACQUIRED ABSENCE OF OTHER ORGANS: Chronic | ICD-10-CM

## 2022-11-10 DIAGNOSIS — Z98.890 OTHER SPECIFIED POSTPROCEDURAL STATES: Chronic | ICD-10-CM

## 2022-11-10 DIAGNOSIS — I34.0 NONRHEUMATIC MITRAL (VALVE) INSUFFICIENCY: ICD-10-CM

## 2022-11-10 LAB
ABO RH CONFIRMATION: SIGNIFICANT CHANGE UP
ALBUMIN SERPL ELPH-MCNC: 3.7 G/DL — SIGNIFICANT CHANGE UP (ref 3.3–5.2)
ALP SERPL-CCNC: 24 U/L — LOW (ref 40–120)
ALT FLD-CCNC: 13 U/L — SIGNIFICANT CHANGE UP
ANION GAP SERPL CALC-SCNC: 14 MMOL/L — SIGNIFICANT CHANGE UP (ref 5–17)
APTT BLD: 32.8 SEC — SIGNIFICANT CHANGE UP (ref 27.5–35.5)
AST SERPL-CCNC: 35 U/L — SIGNIFICANT CHANGE UP
BASE EXCESS BLDA CALC-SCNC: -0.5 MMOL/L — SIGNIFICANT CHANGE UP (ref -2–3)
BASE EXCESS BLDA CALC-SCNC: -2.4 MMOL/L — LOW (ref -2–3)
BASE EXCESS BLDA CALC-SCNC: -2.4 MMOL/L — LOW (ref -2–3)
BASE EXCESS BLDA CALC-SCNC: -4.8 MMOL/L — LOW (ref -2–3)
BASE EXCESS BLDA CALC-SCNC: -6.3 MMOL/L — LOW (ref -2–3)
BASE EXCESS BLDA CALC-SCNC: 2.1 MMOL/L — SIGNIFICANT CHANGE UP (ref -2–3)
BASE EXCESS BLDV CALC-SCNC: -10.1 MMOL/L — LOW (ref -2–3)
BASE EXCESS BLDV CALC-SCNC: -2.4 MMOL/L — LOW (ref -2–3)
BASE EXCESS BLDV CALC-SCNC: -3.6 MMOL/L — LOW (ref -2–3)
BASOPHILS # BLD AUTO: 0.04 K/UL — SIGNIFICANT CHANGE UP (ref 0–0.2)
BASOPHILS NFR BLD AUTO: 0.2 % — SIGNIFICANT CHANGE UP (ref 0–2)
BILIRUB SERPL-MCNC: 1.2 MG/DL — SIGNIFICANT CHANGE UP (ref 0.4–2)
BUN SERPL-MCNC: 13.8 MG/DL — SIGNIFICANT CHANGE UP (ref 8–20)
CA-I BLDA-SCNC: 0.89 MMOL/L — LOW (ref 1.15–1.33)
CA-I BLDA-SCNC: 0.91 MMOL/L — LOW (ref 1.15–1.33)
CA-I BLDA-SCNC: 0.92 MMOL/L — LOW (ref 1.15–1.33)
CA-I BLDA-SCNC: 0.92 MMOL/L — LOW (ref 1.15–1.33)
CA-I BLDA-SCNC: 1.04 MMOL/L — LOW (ref 1.15–1.33)
CA-I BLDA-SCNC: 1.2 MMOL/L — SIGNIFICANT CHANGE UP (ref 1.15–1.33)
CA-I SERPL-SCNC: 0.7 MMOL/L — CRITICAL LOW (ref 1.15–1.33)
CA-I SERPL-SCNC: 0.92 MMOL/L — LOW (ref 1.15–1.33)
CA-I SERPL-SCNC: 0.93 MMOL/L — LOW (ref 1.15–1.33)
CALCIUM SERPL-MCNC: 7.5 MG/DL — LOW (ref 8.4–10.5)
CHLORIDE BLDA-SCNC: 102 MMOL/L — SIGNIFICANT CHANGE UP (ref 96–108)
CHLORIDE BLDA-SCNC: 104 MMOL/L — SIGNIFICANT CHANGE UP (ref 96–108)
CHLORIDE BLDA-SCNC: 104 MMOL/L — SIGNIFICANT CHANGE UP (ref 96–108)
CHLORIDE BLDA-SCNC: 105 MMOL/L — SIGNIFICANT CHANGE UP (ref 96–108)
CHLORIDE BLDV-SCNC: 100 MMOL/L — SIGNIFICANT CHANGE UP (ref 96–108)
CHLORIDE BLDV-SCNC: 104 MMOL/L — SIGNIFICANT CHANGE UP (ref 96–108)
CHLORIDE BLDV-SCNC: 104 MMOL/L — SIGNIFICANT CHANGE UP (ref 96–108)
CHLORIDE SERPL-SCNC: 105 MMOL/L — SIGNIFICANT CHANGE UP (ref 96–108)
CK MB CFR SERPL CALC: 36.4 NG/ML — HIGH (ref 0–6.7)
CK SERPL-CCNC: 249 U/L — HIGH (ref 30–200)
CO2 SERPL-SCNC: 21 MMOL/L — LOW (ref 22–29)
COHGB MFR BLDA: 0.5 % — SIGNIFICANT CHANGE UP
COHGB MFR BLDA: 0.7 % — SIGNIFICANT CHANGE UP
COHGB MFR BLDA: 0.8 % — SIGNIFICANT CHANGE UP
COHGB MFR BLDA: 0.9 % — SIGNIFICANT CHANGE UP
COHGB MFR BLDA: 1 % — SIGNIFICANT CHANGE UP
COHGB MFR BLDA: 1.1 % — SIGNIFICANT CHANGE UP
COHGB MFR BLDV: 1.2 % — SIGNIFICANT CHANGE UP
COHGB MFR BLDV: 1.5 % — SIGNIFICANT CHANGE UP
COHGB MFR BLDV: 1.5 % — SIGNIFICANT CHANGE UP
CREAT SERPL-MCNC: 0.54 MG/DL — SIGNIFICANT CHANGE UP (ref 0.5–1.3)
EGFR: 116 ML/MIN/1.73M2 — SIGNIFICANT CHANGE UP
EOSINOPHIL # BLD AUTO: 0.09 K/UL — SIGNIFICANT CHANGE UP (ref 0–0.5)
EOSINOPHIL NFR BLD AUTO: 0.5 % — SIGNIFICANT CHANGE UP (ref 0–6)
GAS PNL BLDA: SIGNIFICANT CHANGE UP
GAS PNL BLDV: 133 MMOL/L — LOW (ref 136–145)
GAS PNL BLDV: 136 MMOL/L — SIGNIFICANT CHANGE UP (ref 136–145)
GAS PNL BLDV: 136 MMOL/L — SIGNIFICANT CHANGE UP (ref 136–145)
GLUCOSE BLDA-MCNC: 112 MG/DL — HIGH (ref 70–99)
GLUCOSE BLDA-MCNC: 132 MG/DL — HIGH (ref 70–99)
GLUCOSE BLDA-MCNC: 158 MG/DL — HIGH (ref 70–99)
GLUCOSE BLDA-MCNC: 180 MG/DL — HIGH (ref 70–99)
GLUCOSE BLDA-MCNC: 192 MG/DL — HIGH (ref 70–99)
GLUCOSE BLDA-MCNC: 193 MG/DL — HIGH (ref 70–99)
GLUCOSE BLDC GLUCOMTR-MCNC: 114 MG/DL — HIGH (ref 70–99)
GLUCOSE BLDC GLUCOMTR-MCNC: 130 MG/DL — HIGH (ref 70–99)
GLUCOSE BLDC GLUCOMTR-MCNC: 147 MG/DL — HIGH (ref 70–99)
GLUCOSE BLDC GLUCOMTR-MCNC: 160 MG/DL — HIGH (ref 70–99)
GLUCOSE BLDC GLUCOMTR-MCNC: 188 MG/DL — HIGH (ref 70–99)
GLUCOSE BLDC GLUCOMTR-MCNC: 198 MG/DL — HIGH (ref 70–99)
GLUCOSE BLDC GLUCOMTR-MCNC: 216 MG/DL — HIGH (ref 70–99)
GLUCOSE BLDC GLUCOMTR-MCNC: 220 MG/DL — HIGH (ref 70–99)
GLUCOSE BLDV-MCNC: 104 MG/DL — HIGH (ref 70–99)
GLUCOSE BLDV-MCNC: 178 MG/DL — HIGH (ref 70–99)
GLUCOSE BLDV-MCNC: 191 MG/DL — HIGH (ref 70–99)
GLUCOSE SERPL-MCNC: 164 MG/DL — HIGH (ref 70–99)
HCO3 BLDA-SCNC: 19 MMOL/L — LOW (ref 21–28)
HCO3 BLDA-SCNC: 21 MMOL/L — SIGNIFICANT CHANGE UP (ref 21–28)
HCO3 BLDA-SCNC: 22 MMOL/L — SIGNIFICANT CHANGE UP (ref 21–28)
HCO3 BLDA-SCNC: 24 MMOL/L — SIGNIFICANT CHANGE UP (ref 21–28)
HCO3 BLDA-SCNC: 25 MMOL/L — SIGNIFICANT CHANGE UP (ref 21–28)
HCO3 BLDA-SCNC: 27 MMOL/L — SIGNIFICANT CHANGE UP (ref 21–28)
HCO3 BLDV-SCNC: 16 MMOL/L — LOW (ref 22–29)
HCO3 BLDV-SCNC: 23 MMOL/L — SIGNIFICANT CHANGE UP (ref 22–29)
HCO3 BLDV-SCNC: 25 MMOL/L — SIGNIFICANT CHANGE UP (ref 22–29)
HCT VFR BLD CALC: 36.3 % — LOW (ref 39–50)
HCT VFR BLDA CALC: 27 % — SIGNIFICANT CHANGE UP
HCT VFR BLDA CALC: 33 % — SIGNIFICANT CHANGE UP
HCT VFR BLDA CALC: 34 % — SIGNIFICANT CHANGE UP
HCT VFR BLDA CALC: 35 % — SIGNIFICANT CHANGE UP
HCT VFR BLDA CALC: 35 % — SIGNIFICANT CHANGE UP
HCT VFR BLDA CALC: 43 % — SIGNIFICANT CHANGE UP
HCT VFR BLDA CALC: 45 % — SIGNIFICANT CHANGE UP
HGB BLD CALC-MCNC: 11.3 G/DL — LOW (ref 12.6–17.4)
HGB BLD CALC-MCNC: 11.5 G/DL — LOW (ref 12.6–17.4)
HGB BLD CALC-MCNC: 9.1 G/DL — LOW (ref 12.6–17.4)
HGB BLD-MCNC: 12.6 G/DL — LOW (ref 13–17)
HGB BLDA-MCNC: 11 G/DL — LOW (ref 12.6–17.4)
HGB BLDA-MCNC: 11.3 G/DL — LOW (ref 12.6–17.4)
HGB BLDA-MCNC: 11.3 G/DL — LOW (ref 12.6–17.4)
HGB BLDA-MCNC: 11.6 G/DL — LOW (ref 12.6–17.4)
HGB BLDA-MCNC: 14.4 G/DL — SIGNIFICANT CHANGE UP (ref 12.6–17.4)
HGB BLDA-MCNC: 15.1 G/DL — SIGNIFICANT CHANGE UP (ref 12.6–17.4)
IMM GRANULOCYTES NFR BLD AUTO: 2 % — HIGH (ref 0–0.9)
INR BLD: 1.46 RATIO — HIGH (ref 0.88–1.16)
LACTATE BLDA-MCNC: 0.7 MMOL/L — SIGNIFICANT CHANGE UP (ref 0.5–2)
LACTATE BLDA-MCNC: 0.8 MMOL/L — SIGNIFICANT CHANGE UP (ref 0.5–2)
LACTATE BLDA-MCNC: 0.8 MMOL/L — SIGNIFICANT CHANGE UP (ref 0.5–2)
LACTATE BLDA-MCNC: 0.9 MMOL/L — SIGNIFICANT CHANGE UP (ref 0.5–2)
LACTATE BLDA-MCNC: 1.3 MMOL/L — SIGNIFICANT CHANGE UP (ref 0.5–2)
LACTATE BLDA-MCNC: 1.5 MMOL/L — SIGNIFICANT CHANGE UP (ref 0.5–2)
LACTATE BLDV-MCNC: 0.6 MMOL/L — SIGNIFICANT CHANGE UP (ref 0.5–2)
LACTATE BLDV-MCNC: 0.9 MMOL/L — SIGNIFICANT CHANGE UP (ref 0.5–2)
LACTATE BLDV-MCNC: 1.3 MMOL/L — SIGNIFICANT CHANGE UP (ref 0.5–2)
LYMPHOCYTES # BLD AUTO: 1.48 K/UL — SIGNIFICANT CHANGE UP (ref 1–3.3)
LYMPHOCYTES # BLD AUTO: 8.8 % — LOW (ref 13–44)
MAGNESIUM SERPL-MCNC: 2.3 MG/DL — SIGNIFICANT CHANGE UP (ref 1.6–2.6)
MCHC RBC-ENTMCNC: 30.9 PG — SIGNIFICANT CHANGE UP (ref 27–34)
MCHC RBC-ENTMCNC: 34.7 GM/DL — SIGNIFICANT CHANGE UP (ref 32–36)
MCV RBC AUTO: 89 FL — SIGNIFICANT CHANGE UP (ref 80–100)
METHGB MFR BLDA: 0.5 % — SIGNIFICANT CHANGE UP
METHGB MFR BLDA: 0.6 % — SIGNIFICANT CHANGE UP
METHGB MFR BLDA: 0.8 % — SIGNIFICANT CHANGE UP
METHGB MFR BLDA: 0.9 % — SIGNIFICANT CHANGE UP
METHGB MFR BLDA: 1 % — SIGNIFICANT CHANGE UP
METHGB MFR BLDA: 1.6 % — HIGH
METHGB MFR BLDV: 0.1 % — SIGNIFICANT CHANGE UP
METHGB MFR BLDV: 0.6 % — SIGNIFICANT CHANGE UP
METHGB MFR BLDV: 0.7 % — SIGNIFICANT CHANGE UP
MONOCYTES # BLD AUTO: 0.68 K/UL — SIGNIFICANT CHANGE UP (ref 0–0.9)
MONOCYTES NFR BLD AUTO: 4.1 % — SIGNIFICANT CHANGE UP (ref 2–14)
NEUTROPHILS # BLD AUTO: 14.12 K/UL — HIGH (ref 1.8–7.4)
NEUTROPHILS NFR BLD AUTO: 84.4 % — HIGH (ref 43–77)
OXYHGB MFR BLDA: 97 % — HIGH (ref 90–95)
OXYHGB MFR BLDA: 98 % — HIGH (ref 90–95)
OXYHGB MFR BLDA: 99 % — HIGH (ref 90–95)
PCO2 BLDA: 34 MMHG — LOW (ref 35–48)
PCO2 BLDA: 35 MMHG — SIGNIFICANT CHANGE UP (ref 35–48)
PCO2 BLDA: 37 MMHG — SIGNIFICANT CHANGE UP (ref 35–48)
PCO2 BLDA: 39 MMHG — SIGNIFICANT CHANGE UP (ref 35–48)
PCO2 BLDA: 46 MMHG — SIGNIFICANT CHANGE UP (ref 35–48)
PCO2 BLDA: 55 MMHG — HIGH (ref 35–48)
PCO2 BLDV: 30 MMHG — LOW (ref 42–55)
PCO2 BLDV: 45 MMHG — SIGNIFICANT CHANGE UP (ref 42–55)
PCO2 BLDV: 55 MMHG — SIGNIFICANT CHANGE UP (ref 42–55)
PH BLDA: 7.26 — LOW (ref 7.35–7.45)
PH BLDA: 7.34 — LOW (ref 7.35–7.45)
PH BLDA: 7.35 — SIGNIFICANT CHANGE UP (ref 7.35–7.45)
PH BLDA: 7.38 — SIGNIFICANT CHANGE UP (ref 7.35–7.45)
PH BLDA: 7.42 — SIGNIFICANT CHANGE UP (ref 7.35–7.45)
PH BLDA: 7.42 — SIGNIFICANT CHANGE UP (ref 7.35–7.45)
PH BLDV: 7.26 — LOW (ref 7.32–7.43)
PH BLDV: 7.31 — LOW (ref 7.32–7.43)
PH BLDV: 7.33 — SIGNIFICANT CHANGE UP (ref 7.32–7.43)
PHOSPHATE SERPL-MCNC: 1.5 MG/DL — LOW (ref 2.4–4.7)
PLATELET # BLD AUTO: 117 K/UL — LOW (ref 150–400)
PO2 BLDA: 204 MMHG — HIGH (ref 83–108)
PO2 BLDA: >496 MMHG — HIGH (ref 83–108)
PO2 BLDV: 52 MMHG — HIGH (ref 25–45)
PO2 BLDV: 61 MMHG — HIGH (ref 25–45)
PO2 BLDV: 64 MMHG — HIGH (ref 25–45)
POTASSIUM BLDA-SCNC: 3.7 MMOL/L — SIGNIFICANT CHANGE UP (ref 3.5–5.1)
POTASSIUM BLDA-SCNC: 3.8 MMOL/L — SIGNIFICANT CHANGE UP (ref 3.5–5.1)
POTASSIUM BLDA-SCNC: 3.9 MMOL/L — SIGNIFICANT CHANGE UP (ref 3.5–5.1)
POTASSIUM BLDA-SCNC: 4 MMOL/L — SIGNIFICANT CHANGE UP (ref 3.5–5.1)
POTASSIUM BLDA-SCNC: 4.6 MMOL/L — SIGNIFICANT CHANGE UP (ref 3.5–5.1)
POTASSIUM BLDA-SCNC: 4.8 MMOL/L — SIGNIFICANT CHANGE UP (ref 3.5–5.1)
POTASSIUM BLDV-SCNC: 4.2 MMOL/L — SIGNIFICANT CHANGE UP (ref 3.5–5.1)
POTASSIUM BLDV-SCNC: 4.7 MMOL/L — SIGNIFICANT CHANGE UP (ref 3.5–5.1)
POTASSIUM BLDV-SCNC: 4.8 MMOL/L — SIGNIFICANT CHANGE UP (ref 3.5–5.1)
POTASSIUM SERPL-MCNC: 3.6 MMOL/L — SIGNIFICANT CHANGE UP (ref 3.5–5.3)
POTASSIUM SERPL-SCNC: 3.6 MMOL/L — SIGNIFICANT CHANGE UP (ref 3.5–5.3)
PROT SERPL-MCNC: 5 G/DL — LOW (ref 6.6–8.7)
PROTHROM AB SERPL-ACNC: 17 SEC — HIGH (ref 10.5–13.4)
RBC # BLD: 4.08 M/UL — LOW (ref 4.2–5.8)
RBC # FLD: 12 % — SIGNIFICANT CHANGE UP (ref 10.3–14.5)
SAO2 % BLDA: 100 % — HIGH (ref 94–98)
SAO2 % BLDA: 99.3 % — HIGH (ref 94–98)
SAO2 % BLDA: 99.7 % — HIGH (ref 94–98)
SAO2 % BLDV: 86.9 % — SIGNIFICANT CHANGE UP (ref 67–88)
SAO2 % BLDV: 92.2 % — HIGH (ref 67–88)
SAO2 % BLDV: 96.5 % — HIGH (ref 67–88)
SODIUM BLDA-SCNC: 134 MMOL/L — LOW (ref 136–145)
SODIUM BLDA-SCNC: 135 MMOL/L — LOW (ref 136–145)
SODIUM BLDA-SCNC: 136 MMOL/L — SIGNIFICANT CHANGE UP (ref 136–145)
SODIUM BLDA-SCNC: 137 MMOL/L — SIGNIFICANT CHANGE UP (ref 136–145)
SODIUM SERPL-SCNC: 140 MMOL/L — SIGNIFICANT CHANGE UP (ref 135–145)
TROPONIN T SERPL-MCNC: 0.4 NG/ML — HIGH (ref 0–0.06)
WBC # BLD: 16.74 K/UL — HIGH (ref 3.8–10.5)
WBC # FLD AUTO: 16.74 K/UL — HIGH (ref 3.8–10.5)

## 2022-11-10 PROCEDURE — 71045 X-RAY EXAM CHEST 1 VIEW: CPT | Mod: 26,77

## 2022-11-10 PROCEDURE — 33427 REPAIR OF MITRAL VALVE: CPT

## 2022-11-10 PROCEDURE — 93010 ELECTROCARDIOGRAM REPORT: CPT

## 2022-11-10 PROCEDURE — 33427 REPAIR OF MITRAL VALVE: CPT | Mod: AS

## 2022-11-10 PROCEDURE — 71045 X-RAY EXAM CHEST 1 VIEW: CPT | Mod: 26

## 2022-11-10 PROCEDURE — 99291 CRITICAL CARE FIRST HOUR: CPT

## 2022-11-10 PROCEDURE — 93355 ECHO TRANSESOPHAGEAL (TEE): CPT

## 2022-11-10 PROCEDURE — 88307 TISSUE EXAM BY PATHOLOGIST: CPT | Mod: 26

## 2022-11-10 DEVICE — COR-KNOT MINI DEVICE COMBO KIT: Type: IMPLANTABLE DEVICE | Status: FUNCTIONAL

## 2022-11-10 DEVICE — CANNULA VENOUS 1 STAGE RIGHT ANGLE 30FR X 3/8": Type: IMPLANTABLE DEVICE | Status: FUNCTIONAL

## 2022-11-10 DEVICE — KIT A-LINE 1LUM 20G X 12CM SAFE KIT: Type: IMPLANTABLE DEVICE | Status: FUNCTIONAL

## 2022-11-10 DEVICE — LIGATING CLIPS WECK HORIZON MEDIUM (BLUE) 24: Type: IMPLANTABLE DEVICE | Status: FUNCTIONAL

## 2022-11-10 DEVICE — IMPLANTABLE DEVICE: Type: IMPLANTABLE DEVICE | Status: FUNCTIONAL

## 2022-11-10 DEVICE — COR-KNOT QUICK LOAD SINGLES: Type: IMPLANTABLE DEVICE | Status: FUNCTIONAL

## 2022-11-10 DEVICE — LIGATING CLIPS AESCULAP SMALL WIDE 24: Type: IMPLANTABLE DEVICE | Status: FUNCTIONAL

## 2022-11-10 DEVICE — CANNULA AORTIC ROOT 14G X 14CM FLANGED: Type: IMPLANTABLE DEVICE | Status: FUNCTIONAL

## 2022-11-10 DEVICE — MEDIASTINAL CATH DRAIN 9MM: Type: IMPLANTABLE DEVICE | Status: FUNCTIONAL

## 2022-11-10 DEVICE — CANNULA VENOUS 1 STAGE RIGHT ANGLE 28FR X 3/8": Type: IMPLANTABLE DEVICE | Status: FUNCTIONAL

## 2022-11-10 DEVICE — CATH VENT VENTRICULAR PVC 18FR X 4.25" TIP PERFORATION: Type: IMPLANTABLE DEVICE | Status: FUNCTIONAL

## 2022-11-10 DEVICE — KIT CVC 2LUM MAC 9FR CHG: Type: IMPLANTABLE DEVICE | Status: FUNCTIONAL

## 2022-11-10 DEVICE — FLOSEAL FAST PREP 10ML: Type: IMPLANTABLE DEVICE | Status: FUNCTIONAL

## 2022-11-10 DEVICE — CANNULA ARTERIAL SOFT-FLOW 21FR EXTENDED VENTED: Type: IMPLANTABLE DEVICE | Status: FUNCTIONAL

## 2022-11-10 DEVICE — PACING WIRE ORANGE M-25 WINGED WIRE 37MM X 89MM: Type: IMPLANTABLE DEVICE | Status: FUNCTIONAL

## 2022-11-10 DEVICE — PACING WIRE WHITE M-25 WINGED WIRE 37MM X 89MM: Type: IMPLANTABLE DEVICE | Status: FUNCTIONAL

## 2022-11-10 RX ORDER — POTASSIUM CHLORIDE 20 MEQ
10 PACKET (EA) ORAL
Refills: 0 | Status: COMPLETED | OUTPATIENT
Start: 2022-11-10 | End: 2022-11-10

## 2022-11-10 RX ORDER — ALBUMIN HUMAN 25 %
250 VIAL (ML) INTRAVENOUS ONCE
Refills: 0 | Status: COMPLETED | OUTPATIENT
Start: 2022-11-10 | End: 2022-11-10

## 2022-11-10 RX ORDER — SENNA PLUS 8.6 MG/1
2 TABLET ORAL AT BEDTIME
Refills: 0 | Status: DISCONTINUED | OUTPATIENT
Start: 2022-11-10 | End: 2022-11-15

## 2022-11-10 RX ORDER — PANTOPRAZOLE SODIUM 20 MG/1
40 TABLET, DELAYED RELEASE ORAL DAILY
Refills: 0 | Status: DISCONTINUED | OUTPATIENT
Start: 2022-11-11 | End: 2022-11-15

## 2022-11-10 RX ORDER — ASPIRIN/CALCIUM CARB/MAGNESIUM 324 MG
325 TABLET ORAL DAILY
Refills: 0 | Status: DISCONTINUED | OUTPATIENT
Start: 2022-11-11 | End: 2022-11-15

## 2022-11-10 RX ORDER — CALCIUM GLUCONATE 100 MG/ML
2 VIAL (ML) INTRAVENOUS ONCE
Refills: 0 | Status: COMPLETED | OUTPATIENT
Start: 2022-11-10 | End: 2022-11-10

## 2022-11-10 RX ORDER — POTASSIUM CHLORIDE 20 MEQ
10 PACKET (EA) ORAL
Refills: 0 | Status: DISCONTINUED | OUTPATIENT
Start: 2022-11-10 | End: 2022-11-11

## 2022-11-10 RX ORDER — CEFUROXIME AXETIL 250 MG
1500 TABLET ORAL EVERY 8 HOURS
Refills: 0 | Status: COMPLETED | OUTPATIENT
Start: 2022-11-10 | End: 2022-11-12

## 2022-11-10 RX ORDER — POLYETHYLENE GLYCOL 3350 17 G/17G
17 POWDER, FOR SOLUTION ORAL DAILY
Refills: 0 | Status: DISCONTINUED | OUTPATIENT
Start: 2022-11-11 | End: 2022-11-15

## 2022-11-10 RX ORDER — INSULIN HUMAN 100 [IU]/ML
2 INJECTION, SOLUTION SUBCUTANEOUS
Qty: 100 | Refills: 0 | Status: DISCONTINUED | OUTPATIENT
Start: 2022-11-10 | End: 2022-11-11

## 2022-11-10 RX ORDER — VANCOMYCIN HCL 1 G
1000 VIAL (EA) INTRAVENOUS EVERY 12 HOURS
Refills: 0 | Status: COMPLETED | OUTPATIENT
Start: 2022-11-10 | End: 2022-11-12

## 2022-11-10 RX ORDER — CEFUROXIME AXETIL 250 MG
1500 TABLET ORAL ONCE
Refills: 0 | Status: DISCONTINUED | OUTPATIENT
Start: 2022-11-10 | End: 2022-11-10

## 2022-11-10 RX ORDER — SODIUM BICARBONATE 1 MEQ/ML
50 SYRINGE (ML) INTRAVENOUS ONCE
Refills: 0 | Status: COMPLETED | OUTPATIENT
Start: 2022-11-10 | End: 2022-11-10

## 2022-11-10 RX ORDER — PANTOPRAZOLE SODIUM 20 MG/1
40 TABLET, DELAYED RELEASE ORAL ONCE
Refills: 0 | Status: COMPLETED | OUTPATIENT
Start: 2022-11-10 | End: 2022-11-10

## 2022-11-10 RX ORDER — DEXTROSE 50 % IN WATER 50 %
50 SYRINGE (ML) INTRAVENOUS
Refills: 0 | Status: DISCONTINUED | OUTPATIENT
Start: 2022-11-10 | End: 2022-11-11

## 2022-11-10 RX ORDER — FENTANYL CITRATE 50 UG/ML
50 INJECTION INTRAVENOUS ONCE
Refills: 0 | Status: DISCONTINUED | OUTPATIENT
Start: 2022-11-10 | End: 2022-11-10

## 2022-11-10 RX ORDER — NOREPINEPHRINE BITARTRATE/D5W 8 MG/250ML
0.05 PLASTIC BAG, INJECTION (ML) INTRAVENOUS
Qty: 8 | Refills: 0 | Status: DISCONTINUED | OUTPATIENT
Start: 2022-11-10 | End: 2022-11-11

## 2022-11-10 RX ORDER — ONDANSETRON 8 MG/1
4 TABLET, FILM COATED ORAL EVERY 6 HOURS
Refills: 0 | Status: DISCONTINUED | OUTPATIENT
Start: 2022-11-10 | End: 2022-11-15

## 2022-11-10 RX ORDER — DEXTROSE 50 % IN WATER 50 %
25 SYRINGE (ML) INTRAVENOUS
Refills: 0 | Status: DISCONTINUED | OUTPATIENT
Start: 2022-11-10 | End: 2022-11-11

## 2022-11-10 RX ORDER — AMIODARONE HYDROCHLORIDE 400 MG/1
150 TABLET ORAL ONCE
Refills: 0 | Status: COMPLETED | OUTPATIENT
Start: 2022-11-10 | End: 2022-11-10

## 2022-11-10 RX ORDER — CHLORHEXIDINE GLUCONATE 213 G/1000ML
15 SOLUTION TOPICAL EVERY 12 HOURS
Refills: 0 | Status: DISCONTINUED | OUTPATIENT
Start: 2022-11-10 | End: 2022-11-10

## 2022-11-10 RX ORDER — ASPIRIN/CALCIUM CARB/MAGNESIUM 324 MG
324 TABLET ORAL ONCE
Refills: 0 | Status: COMPLETED | OUTPATIENT
Start: 2022-11-10 | End: 2022-11-10

## 2022-11-10 RX ORDER — AMIODARONE HYDROCHLORIDE 400 MG/1
TABLET ORAL
Refills: 0 | Status: DISCONTINUED | OUTPATIENT
Start: 2022-11-10 | End: 2022-11-12

## 2022-11-10 RX ORDER — PSYLLIUM SEED (WITH DEXTROSE)
1 POWDER (GRAM) ORAL
Refills: 0 | Status: DISCONTINUED | OUTPATIENT
Start: 2022-11-11 | End: 2022-11-15

## 2022-11-10 RX ORDER — SODIUM CHLORIDE 9 MG/ML
3 INJECTION INTRAMUSCULAR; INTRAVENOUS; SUBCUTANEOUS EVERY 8 HOURS
Refills: 0 | Status: DISCONTINUED | OUTPATIENT
Start: 2022-11-10 | End: 2022-11-10

## 2022-11-10 RX ORDER — AMIODARONE HYDROCHLORIDE 400 MG/1
400 TABLET ORAL EVERY 8 HOURS
Refills: 0 | Status: DISCONTINUED | OUTPATIENT
Start: 2022-11-10 | End: 2022-11-12

## 2022-11-10 RX ORDER — ALBUMIN HUMAN 25 %
250 VIAL (ML) INTRAVENOUS
Refills: 0 | Status: COMPLETED | OUTPATIENT
Start: 2022-11-10 | End: 2022-11-10

## 2022-11-10 RX ORDER — CHOLECALCIFEROL (VITAMIN D3) 125 MCG
1 CAPSULE ORAL
Qty: 0 | Refills: 0 | DISCHARGE

## 2022-11-10 RX ORDER — ACETAMINOPHEN 500 MG
1000 TABLET ORAL ONCE
Refills: 0 | Status: DISCONTINUED | OUTPATIENT
Start: 2022-11-10 | End: 2022-11-11

## 2022-11-10 RX ORDER — ATORVASTATIN CALCIUM 80 MG/1
80 TABLET, FILM COATED ORAL AT BEDTIME
Refills: 0 | Status: DISCONTINUED | OUTPATIENT
Start: 2022-11-10 | End: 2022-11-14

## 2022-11-10 RX ORDER — SODIUM CHLORIDE 9 MG/ML
1000 INJECTION INTRAMUSCULAR; INTRAVENOUS; SUBCUTANEOUS
Refills: 0 | Status: DISCONTINUED | OUTPATIENT
Start: 2022-11-10 | End: 2022-11-12

## 2022-11-10 RX ORDER — CHLORHEXIDINE GLUCONATE 213 G/1000ML
5 SOLUTION TOPICAL
Refills: 0 | Status: DISCONTINUED | OUTPATIENT
Start: 2022-11-10 | End: 2022-11-10

## 2022-11-10 RX ORDER — CHLORHEXIDINE GLUCONATE 213 G/1000ML
1 SOLUTION TOPICAL DAILY
Refills: 0 | Status: DISCONTINUED | OUTPATIENT
Start: 2022-11-10 | End: 2022-11-12

## 2022-11-10 RX ADMIN — Medication 100 MILLIEQUIVALENT(S): at 17:19

## 2022-11-10 RX ADMIN — FENTANYL CITRATE 50 MICROGRAM(S): 50 INJECTION INTRAVENOUS at 12:10

## 2022-11-10 RX ADMIN — Medication 100 MILLIEQUIVALENT(S): at 12:01

## 2022-11-10 RX ADMIN — AMIODARONE HYDROCHLORIDE 618 MILLIGRAM(S): 400 TABLET ORAL at 14:07

## 2022-11-10 RX ADMIN — Medication 125 MILLILITER(S): at 15:46

## 2022-11-10 RX ADMIN — SENNA PLUS 2 TABLET(S): 8.6 TABLET ORAL at 21:01

## 2022-11-10 RX ADMIN — Medication 125 MILLILITER(S): at 15:47

## 2022-11-10 RX ADMIN — Medication 100 MILLIEQUIVALENT(S): at 13:09

## 2022-11-10 RX ADMIN — Medication 100 MILLIEQUIVALENT(S): at 14:09

## 2022-11-10 RX ADMIN — Medication 100 MILLIEQUIVALENT(S): at 21:31

## 2022-11-10 RX ADMIN — ONDANSETRON 4 MILLIGRAM(S): 8 TABLET, FILM COATED ORAL at 17:57

## 2022-11-10 RX ADMIN — Medication 100 MILLIEQUIVALENT(S): at 15:48

## 2022-11-10 RX ADMIN — Medication 250 MILLIGRAM(S): at 20:58

## 2022-11-10 RX ADMIN — Medication 100 MILLIGRAM(S): at 16:57

## 2022-11-10 RX ADMIN — Medication 100 MILLIEQUIVALENT(S): at 18:30

## 2022-11-10 RX ADMIN — Medication 125 MILLILITER(S): at 13:09

## 2022-11-10 RX ADMIN — Medication 200 GRAM(S): at 17:57

## 2022-11-10 RX ADMIN — Medication 1500 MILLILITER(S): at 14:06

## 2022-11-10 RX ADMIN — Medication 50 MILLIEQUIVALENT(S): at 14:08

## 2022-11-10 RX ADMIN — Medication 324 MILLIGRAM(S): at 15:01

## 2022-11-10 RX ADMIN — Medication 100 MILLIEQUIVALENT(S): at 22:42

## 2022-11-10 RX ADMIN — FENTANYL CITRATE 50 MICROGRAM(S): 50 INJECTION INTRAVENOUS at 11:40

## 2022-11-10 RX ADMIN — PANTOPRAZOLE SODIUM 40 MILLIGRAM(S): 20 TABLET, DELAYED RELEASE ORAL at 12:40

## 2022-11-10 RX ADMIN — CHLORHEXIDINE GLUCONATE 1 APPLICATION(S): 213 SOLUTION TOPICAL at 12:42

## 2022-11-10 RX ADMIN — AMIODARONE HYDROCHLORIDE 400 MILLIGRAM(S): 400 TABLET ORAL at 18:13

## 2022-11-10 RX ADMIN — Medication 1500 MILLILITER(S): at 14:10

## 2022-11-10 RX ADMIN — ATORVASTATIN CALCIUM 80 MILLIGRAM(S): 80 TABLET, FILM COATED ORAL at 21:01

## 2022-11-10 RX ADMIN — Medication 200 GRAM(S): at 20:57

## 2022-11-10 RX ADMIN — Medication 100 MILLIEQUIVALENT(S): at 22:05

## 2022-11-10 NOTE — BRIEF OPERATIVE NOTE - COMMENTS
Cell Saver Utilized - Unable to Quantify Blood Loss  Invasive Lines: RRAL, Rt IJ Cordis  IV Medication Infusions: Levo, Insulin  No qualified resident was available to assist in this case. I have personally first assisted the Cardiothoracic Surgeon listed in this brief op note throughout the entirety of this case.

## 2022-11-10 NOTE — BRIEF OPERATIVE NOTE - OPERATION/FINDINGS
MV-Repair:  -Annuloplasty (34mm Blanchard-Model 5200, Serial# [id number])  -Triangular Mitral Leaflet Resection  -Alex-Cords MV-Repair:  -Annuloplasty (34mm Blanchard-Model 5200, Serial# [id number])  -Triangular Mitral Leaflet Resection  -Chord-X

## 2022-11-10 NOTE — PROGRESS NOTE ADULT - SUBJECTIVE AND OBJECTIVE BOX
MARY CARMEN TELLEZ  MRN-[id number]    HPI:  Pt is a 57 years old male seen today pre-op for Mitral valve repair, possible replace. [place] [facility] [place] [facility] [place]ations and shortness of breath. An echocardiogram was performed at that time revealing a "leaky valve". Pt  had followed up with Cardiology Dr. Chaudhari  post discharge and echocardiogram was performed revealing mitral valve dysfunction. Pt had Cardiac Cath on 9/15/22. Pt endorsed he has not seen a physician for many years. Pt  today report occasional episodes of palpitations, denies chest pain, peripheral edema, shortness of breath, exertional dyspnea, orthopnea and dizziness, lightheadedness, syncope fever/chills  and any other related issues. Pt seen today for a scheduled surgery on 11/10/2022 with Dr. Pollock.    (21 Oct 2022 08:44)      Surgery/Hospital Course:  ·  PRE-OP DIAGNOSIS:  Mitral insufficiency   ·  POST-OP DIAGNOSIS:  Mitral insufficiency   ·  PROCEDURES:  Repair, mitral valve, with LOULOU 10-Nov-2022   Today:  No acute events     ICU Vital Signs Last 24 Hrs  T(C): 36.7 (10 Nov 2022 06:07), Max: 36.7 (10 Nov 2022 06:07)  T(F): 98 (10 Nov 2022 06:07), Max: 98 (10 Nov 2022 06:07)  HR: 87 (10 Nov 2022 11:11) (60 - 87)  BP: 134/80 (10 Nov 2022 06:07) (134/80 - 134/80)  BP(mean): --  ABP: --  ABP(mean): --  RR: 15 (10 Nov 2022 06:07) (15 - 15)  SpO2: 100% (10 Nov 2022 11:11) (100% - 100%)        Physical Exam:  Gen: sedated   CNS: non focal 	  Neck: no JVD  RES : clear , no wheezing              CVS: Regular  rhythm. Normal S1/S2  Abd: Soft, non-distended. Bowel sounds present.  Skin: No rash.  Ext:  no edema    ============================I/O===========================   I&O's Detail    ============================ LABS =========================                        12.6   16.74 )-----------( 117      ( 10 Nov 2022 11:38 )             36.3             PT/INR - ( 10 Nov 2022 11:38 )   PT: 17.0 sec;   INR: 1.46 ratio         PTT - ( 10 Nov 2022 11:38 )  PTT:32.8 sec  ABG - ( 10 Nov 2022 11:34 )  pH, Arterial: 7.450 pH, Blood: x     /  pCO2: 33    /  pO2: 265   / HCO3: 23    / Base Excess: -1.1  /  SaO2: 100.0               ======================Micro/Rad/Cardio=================  Culture: Reviewed   CXR: Reviewed  Echo:Reviewed  ======================================================  PAST MEDICAL & SURGICAL HISTORY:  Hypertension      Palpitations      Mitral valve stenosis, moderate      Nonrheumatic mitral valve regurgitation      History of tonsillectomy      History of circumcision        ====================ASSESSMENT ==============  Pt is a 57 years old male seen today pre-op for Mitral valve repair, possible replace. [place] [facility] [place] [facility] [place]ations and shortness of breath. An echocardiogram was performed at that time revealing a "leaky valve". Pt  had followed up with Cardiology Dr. Chaudhari  post discharge and echocardiogram was performed revealing mitral valve dysfunction. Pt had Cardiac Cath on 9/15/22. Pt endorsed he has not seen a physician for many years. Pt  today report occasional episodes of palpitations, denies chest pain, peripheral edema, shortness of breath, exertional dyspnea, orthopnea and dizziness, lightheadedness, syncope fever/chills  and any other related issues. Pt seen today for a scheduled surgery on 11/10/2022 with Dr. Pollock.    (21 Oct 2022 08:44)    Hypertension  Palpitations  Mitral valve stenosis, moderate  Nonrheumatic mitral valve regurgitation  ---Mitral insufficiency   ---SP Repair, mitral valve, with LOULOU 10-Nov-2022   ---Post op Hypovolemia  ---Post op respiratory insufficiency       Plan:  ====================== NEUROLOGY=====================  acetaminophen   IVPB .. 1000 milliGRAM(s) IV Intermittent once  fentaNYL    Injectable 50 MICROGram(s) IV Push once  ondansetron Injectable 4 milliGRAM(s) IV Push every 6 hours PRN Nausea and/or Vomiting    ==================== RESPIRATORY======================  Post op respiratory insufficiency  Mechanical Ventilation:  Mode: AC/ CMV (Assist Control/ Continuous Mandatory Ventilation)  RR (machine): 12  TV (machine): 700  FiO2: 30  PEEP: 5  MAP: 8  PIP: 15      ====================CARDIOVASCULAR==================  Post op Hypovolemia  norepinephrine Infusion 0.05 MICROgram(s)/kG/Min (7.65 mL/Hr) IV Continuous <Continuous>    ===================HEMATOLOGIC/ONC ===================  Monitor H&H/Plts    aspirin  chewable 324 milliGRAM(s) Oral once    ===================== RENAL =========================  Continue monitoring urine output, I&OS, BUN/Cr     ==================== GASTROINTESTINAL===================  pantoprazole  Injectable 40 milliGRAM(s) IV Push once  potassium chloride  10 mEq/50 mL IVPB 10 milliEquivalent(s) IV Intermittent every 1 hour  potassium chloride  10 mEq/50 mL IVPB 10 milliEquivalent(s) IV Intermittent every 1 hour  potassium chloride  10 mEq/50 mL IVPB 10 milliEquivalent(s) IV Intermittent every 1 hour  senna 2 Tablet(s) Oral at bedtime  sodium chloride 0.9%. 1000 milliLiter(s) (10 mL/Hr) IV Continuous <Continuous>  sodium chloride 0.9%. 1000 milliLiter(s) (5 mL/Hr) IV Continuous <Continuous>    =======================    ENDOCRINE  =====================  atorvastatin 80 milliGRAM(s) Oral at bedtime  dextrose 50% Injectable 50 milliLiter(s) IV Push every 15 minutes  dextrose 50% Injectable 25 milliLiter(s) IV Push every 15 minutes  insulin regular Infusion 2 Unit(s)/Hr (2 mL/Hr) IV Continuous <Continuous>    ========================INFECTIOUS DISEASE================  cefuroxime  IVPB 1500 milliGRAM(s) IV Intermittent every 8 hours  vancomycin  IVPB 1000 milliGRAM(s) IV Intermittent every 12 hours      -Close hemodynamic , ventilatory and drain monitoring and management per post op routine .  -Monitor Neurologic status ,   -Head of the bed should remain elevated to 45 degrees,  -Monitor adequacy of oxygenation and ventilation and attempt to wean oxygen ,  -Monitor for arrhythmias and monitor parameters for organ perfusion,  -Glycemic control is satisfactory,  -Nutritional goals will be met using po eventually , insure adequate caloric intake and monitor the same ,  -Electrolytes have been repleted as necessary , pain control has been achieved  and wound care has been carried out ,  -Stress ulcer and VTE prophylaxis will be achieved,  -Agressive PT and early mobility and ambulation goals will be met,  -The family was updated about the course and plan .      I have spent 35 minutes providing acute care for this critically ill patient     Patient requires continuous monitoring with bedside rhythm monitoring, pulse ox monitoring, and intermittent blood gas analysis. Care plan discussed with ICU care team. Patient remained critical and at risk for life threatening decompensation.

## 2022-11-11 LAB
ALBUMIN SERPL ELPH-MCNC: 4.3 G/DL — SIGNIFICANT CHANGE UP (ref 3.3–5.2)
ALP SERPL-CCNC: 22 U/L — LOW (ref 40–120)
ALT FLD-CCNC: 13 U/L — SIGNIFICANT CHANGE UP
ANION GAP SERPL CALC-SCNC: 11 MMOL/L — SIGNIFICANT CHANGE UP (ref 5–17)
APTT BLD: 32.7 SEC — SIGNIFICANT CHANGE UP (ref 27.5–35.5)
AST SERPL-CCNC: 42 U/L — HIGH
BILIRUB SERPL-MCNC: 0.9 MG/DL — SIGNIFICANT CHANGE UP (ref 0.4–2)
BUN SERPL-MCNC: 12.9 MG/DL — SIGNIFICANT CHANGE UP (ref 8–20)
CALCIUM SERPL-MCNC: 8.3 MG/DL — LOW (ref 8.4–10.5)
CHLORIDE SERPL-SCNC: 106 MMOL/L — SIGNIFICANT CHANGE UP (ref 96–108)
CK MB CFR SERPL CALC: 30.4 NG/ML — HIGH (ref 0–6.7)
CK SERPL-CCNC: 367 U/L — HIGH (ref 30–200)
CO2 SERPL-SCNC: 23 MMOL/L — SIGNIFICANT CHANGE UP (ref 22–29)
CREAT SERPL-MCNC: 0.66 MG/DL — SIGNIFICANT CHANGE UP (ref 0.5–1.3)
EGFR: 109 ML/MIN/1.73M2 — SIGNIFICANT CHANGE UP
GLUCOSE BLDC GLUCOMTR-MCNC: 100 MG/DL — HIGH (ref 70–99)
GLUCOSE BLDC GLUCOMTR-MCNC: 101 MG/DL — HIGH (ref 70–99)
GLUCOSE BLDC GLUCOMTR-MCNC: 104 MG/DL — HIGH (ref 70–99)
GLUCOSE BLDC GLUCOMTR-MCNC: 105 MG/DL — HIGH (ref 70–99)
GLUCOSE BLDC GLUCOMTR-MCNC: 110 MG/DL — HIGH (ref 70–99)
GLUCOSE BLDC GLUCOMTR-MCNC: 113 MG/DL — HIGH (ref 70–99)
GLUCOSE BLDC GLUCOMTR-MCNC: 132 MG/DL — HIGH (ref 70–99)
GLUCOSE BLDC GLUCOMTR-MCNC: 140 MG/DL — HIGH (ref 70–99)
GLUCOSE BLDC GLUCOMTR-MCNC: 143 MG/DL — HIGH (ref 70–99)
GLUCOSE BLDC GLUCOMTR-MCNC: 44 MG/DL — CRITICAL LOW (ref 70–99)
GLUCOSE BLDC GLUCOMTR-MCNC: 98 MG/DL — SIGNIFICANT CHANGE UP (ref 70–99)
GLUCOSE SERPL-MCNC: 103 MG/DL — HIGH (ref 70–99)
HCT VFR BLD CALC: 28.9 % — LOW (ref 39–50)
HGB BLD-MCNC: 10.2 G/DL — LOW (ref 13–17)
INR BLD: 1.27 RATIO — HIGH (ref 0.88–1.16)
MCHC RBC-ENTMCNC: 31.7 PG — SIGNIFICANT CHANGE UP (ref 27–34)
MCHC RBC-ENTMCNC: 35.3 GM/DL — SIGNIFICANT CHANGE UP (ref 32–36)
MCV RBC AUTO: 89.8 FL — SIGNIFICANT CHANGE UP (ref 80–100)
PLATELET # BLD AUTO: 116 K/UL — LOW (ref 150–400)
POTASSIUM SERPL-MCNC: 4.3 MMOL/L — SIGNIFICANT CHANGE UP (ref 3.5–5.3)
POTASSIUM SERPL-SCNC: 4.3 MMOL/L — SIGNIFICANT CHANGE UP (ref 3.5–5.3)
PROT SERPL-MCNC: 5.5 G/DL — LOW (ref 6.6–8.7)
PROTHROM AB SERPL-ACNC: 14.8 SEC — HIGH (ref 10.5–13.4)
RBC # BLD: 3.22 M/UL — LOW (ref 4.2–5.8)
RBC # FLD: 12.4 % — SIGNIFICANT CHANGE UP (ref 10.3–14.5)
SODIUM SERPL-SCNC: 140 MMOL/L — SIGNIFICANT CHANGE UP (ref 135–145)
TROPONIN T SERPL-MCNC: 0.45 NG/ML — HIGH (ref 0–0.06)
WBC # BLD: 14.72 K/UL — HIGH (ref 3.8–10.5)
WBC # FLD AUTO: 14.72 K/UL — HIGH (ref 3.8–10.5)

## 2022-11-11 PROCEDURE — 93010 ELECTROCARDIOGRAM REPORT: CPT

## 2022-11-11 PROCEDURE — 99291 CRITICAL CARE FIRST HOUR: CPT

## 2022-11-11 PROCEDURE — 71045 X-RAY EXAM CHEST 1 VIEW: CPT | Mod: 26

## 2022-11-11 PROCEDURE — 99024 POSTOP FOLLOW-UP VISIT: CPT

## 2022-11-11 RX ORDER — ASCORBIC ACID 60 MG
500 TABLET,CHEWABLE ORAL DAILY
Refills: 0 | Status: DISCONTINUED | OUTPATIENT
Start: 2022-11-11 | End: 2022-11-15

## 2022-11-11 RX ORDER — ACETAMINOPHEN 500 MG
650 TABLET ORAL EVERY 6 HOURS
Refills: 0 | Status: DISCONTINUED | OUTPATIENT
Start: 2022-11-13 | End: 2022-11-15

## 2022-11-11 RX ORDER — INSULIN LISPRO 100/ML
2 VIAL (ML) SUBCUTANEOUS
Refills: 0 | Status: DISCONTINUED | OUTPATIENT
Start: 2022-11-11 | End: 2022-11-11

## 2022-11-11 RX ORDER — OXYCODONE HYDROCHLORIDE 5 MG/1
10 TABLET ORAL EVERY 4 HOURS
Refills: 0 | Status: DISCONTINUED | OUTPATIENT
Start: 2022-11-11 | End: 2022-11-15

## 2022-11-11 RX ORDER — DEXTROSE 10 % IN WATER 10 %
250 INTRAVENOUS SOLUTION INTRAVENOUS ONCE
Refills: 0 | Status: DISCONTINUED | OUTPATIENT
Start: 2022-11-11 | End: 2022-11-14

## 2022-11-11 RX ORDER — ENOXAPARIN SODIUM 100 MG/ML
40 INJECTION SUBCUTANEOUS EVERY 24 HOURS
Refills: 0 | Status: DISCONTINUED | OUTPATIENT
Start: 2022-11-11 | End: 2022-11-14

## 2022-11-11 RX ORDER — INFLUENZA VIRUS VACCINE 15; 15; 15; 15 UG/.5ML; UG/.5ML; UG/.5ML; UG/.5ML
0.5 SUSPENSION INTRAMUSCULAR ONCE
Refills: 0 | Status: DISCONTINUED | OUTPATIENT
Start: 2022-11-11 | End: 2022-11-15

## 2022-11-11 RX ORDER — FOLIC ACID 0.8 MG
1 TABLET ORAL DAILY
Refills: 0 | Status: DISCONTINUED | OUTPATIENT
Start: 2022-11-11 | End: 2022-11-15

## 2022-11-11 RX ORDER — METOPROLOL TARTRATE 50 MG
12.5 TABLET ORAL
Refills: 0 | Status: DISCONTINUED | OUTPATIENT
Start: 2022-11-11 | End: 2022-11-15

## 2022-11-11 RX ORDER — OXYCODONE HYDROCHLORIDE 5 MG/1
5 TABLET ORAL EVERY 4 HOURS
Refills: 0 | Status: DISCONTINUED | OUTPATIENT
Start: 2022-11-11 | End: 2022-11-15

## 2022-11-11 RX ORDER — FERROUS SULFATE 325(65) MG
325 TABLET ORAL DAILY
Refills: 0 | Status: DISCONTINUED | OUTPATIENT
Start: 2022-11-11 | End: 2022-11-15

## 2022-11-11 RX ORDER — ACETAMINOPHEN 500 MG
975 TABLET ORAL EVERY 8 HOURS
Refills: 0 | Status: COMPLETED | OUTPATIENT
Start: 2022-11-11 | End: 2022-11-12

## 2022-11-11 RX ORDER — INSULIN LISPRO 100/ML
VIAL (ML) SUBCUTANEOUS
Refills: 0 | Status: DISCONTINUED | OUTPATIENT
Start: 2022-11-11 | End: 2022-11-14

## 2022-11-11 RX ADMIN — POLYETHYLENE GLYCOL 3350 17 GRAM(S): 17 POWDER, FOR SOLUTION ORAL at 11:16

## 2022-11-11 RX ADMIN — ENOXAPARIN SODIUM 40 MILLIGRAM(S): 100 INJECTION SUBCUTANEOUS at 11:16

## 2022-11-11 RX ADMIN — Medication 1 TABLET(S): at 11:15

## 2022-11-11 RX ADMIN — Medication 975 MILLIGRAM(S): at 22:10

## 2022-11-11 RX ADMIN — AMIODARONE HYDROCHLORIDE 400 MILLIGRAM(S): 400 TABLET ORAL at 21:36

## 2022-11-11 RX ADMIN — Medication 100 MILLIGRAM(S): at 00:39

## 2022-11-11 RX ADMIN — Medication 250 MILLIGRAM(S): at 09:14

## 2022-11-11 RX ADMIN — Medication 100 MILLIGRAM(S): at 18:03

## 2022-11-11 RX ADMIN — Medication 500 MILLIGRAM(S): at 11:15

## 2022-11-11 RX ADMIN — Medication 325 MILLIGRAM(S): at 11:15

## 2022-11-11 RX ADMIN — ATORVASTATIN CALCIUM 80 MILLIGRAM(S): 80 TABLET, FILM COATED ORAL at 21:36

## 2022-11-11 RX ADMIN — Medication 975 MILLIGRAM(S): at 21:37

## 2022-11-11 RX ADMIN — Medication 1 PACKET(S): at 18:02

## 2022-11-11 RX ADMIN — AMIODARONE HYDROCHLORIDE 400 MILLIGRAM(S): 400 TABLET ORAL at 05:15

## 2022-11-11 RX ADMIN — Medication 1 MILLIGRAM(S): at 11:15

## 2022-11-11 RX ADMIN — AMIODARONE HYDROCHLORIDE 400 MILLIGRAM(S): 400 TABLET ORAL at 14:00

## 2022-11-11 RX ADMIN — PANTOPRAZOLE SODIUM 40 MILLIGRAM(S): 20 TABLET, DELAYED RELEASE ORAL at 11:16

## 2022-11-11 RX ADMIN — CHLORHEXIDINE GLUCONATE 1 APPLICATION(S): 213 SOLUTION TOPICAL at 11:39

## 2022-11-11 RX ADMIN — Medication 12.5 MILLIGRAM(S): at 05:38

## 2022-11-11 RX ADMIN — Medication 1 PACKET(S): at 05:15

## 2022-11-11 RX ADMIN — Medication 975 MILLIGRAM(S): at 13:59

## 2022-11-11 RX ADMIN — Medication 975 MILLIGRAM(S): at 05:35

## 2022-11-11 RX ADMIN — Medication 250 MILLIGRAM(S): at 20:48

## 2022-11-11 RX ADMIN — Medication 975 MILLIGRAM(S): at 05:15

## 2022-11-11 RX ADMIN — SENNA PLUS 2 TABLET(S): 8.6 TABLET ORAL at 21:36

## 2022-11-11 RX ADMIN — Medication 100 MILLIGRAM(S): at 08:23

## 2022-11-11 NOTE — PHYSICAL THERAPY INITIAL EVALUATION ADULT - GENERAL OBSERVATIONS, REHAB EVAL
Pt received in semifowler position with monitor, BP cuff, monitor, vogel, pleasant and agreeable to PT with minimal pain

## 2022-11-11 NOTE — PROGRESS NOTE ADULT - SUBJECTIVE AND OBJECTIVE BOX
MARY CARMEN TELLEZ  MRN-[id number]    HPI:  Pt is a 57 years old male seen today pre-op for Mitral valve repair, possible replace. Pt [place] [facility] [place]ations and shortness of breath. An echocardiogram was performed at that time revealing a "leaky valve". Pt  had followed up with Cardiology Dr. Chaudhari  post discharge and echocardiogram was performed revealing mitral valve dysfunction. Pt had Cardiac Cath on 9/15/22. Pt endorsed he has not seen a physician for many years. Pt  today report occasional episodes of palpitations, denies chest pain, peripheral edema, shortness of breath, exertional dyspnea, orthopnea and dizziness, lightheadedness, syncope fever/chills  and any other related issues. Pt seen today for a scheduled surgery on 11/10/2022 with Dr. Pollock.    (21 Oct 2022 08:44)      Surgery/Hospital Course:  ·  PRE-OP DIAGNOSIS:  Mitral insufficiency   ·  POST-OP DIAGNOSIS:  Mitral insufficiency   ·  PROCEDURES:  Repair, mitral valve, with LOULOU 10-Nov-2022   Today:  No acute events  DC FEMORAL A  LINE  (done)  start Lopressor, Amio      ICU Vital Signs Last 24 Hrs  T(C): 37.1 (11 Nov 2022 08:00), Max: 37.2 (11 Nov 2022 00:00)  T(F): 98.8 (11 Nov 2022 08:00), Max: 99 (11 Nov 2022 00:00)  HR: 79 (11 Nov 2022 10:00) (75 - 105)  BP: 95/50 (11 Nov 2022 10:00) (88/58 - 123/69)  BP(mean): 66 (11 Nov 2022 10:00) (66 - 90)  ABP: 121/72 (11 Nov 2022 04:00) (42/29 - 142/71)  ABP(mean): 84 (11 Nov 2022 04:00) (34 - 106)  RR: 22 (11 Nov 2022 10:00) (9 - 27)  SpO2: 98% (11 Nov 2022 10:00) (94% - 100%)    O2 Parameters below as of 11 Nov 2022 04:00  Patient On (Oxygen Delivery Method): room air            Physical Exam:  Gen: A&O   CNS: non focal 	  Neck: no JVD  RES : clear , no wheezing              CVS: Regular  rhythm. Normal S1/S2  Abd: Soft, non-distended. Bowel sounds present.  Skin: No rash.  Ext:  no edema    ============================I/O===========================   I&O's Detail    10 Nov 2022 07:01  -  11 Nov 2022 07:00  --------------------------------------------------------  IN:    Albumin 5%  - 250 mL: 1250 mL    Insulin: 21.5 mL    Insulin: 21 mL    IV PiggyBack: 200 mL    IV PiggyBack: 250 mL    IV PiggyBack: 100 mL    IV PiggyBack: 100 mL    IV PiggyBack: 450 mL    Lactated Ringers Bolus: 500 mL    Norepinephrine: 5.5 mL    Oral Fluid: 200 mL    sodium chloride 0.9%: 60 mL    sodium chloride 0.9%: 120 mL  Total IN: 3278 mL    OUT:    Chest Tube (mL): 670 mL    Indwelling Catheter - Urethral (mL): 2690 mL  Total OUT: 3360 mL    Total NET: -82 mL      11 Nov 2022 07:01  -  11 Nov 2022 11:16  --------------------------------------------------------  IN:    sodium chloride 0.9%: 10 mL    sodium chloride 0.9%: 20 mL  Total IN: 30 mL    OUT:    Chest Tube (mL): 60 mL    Indwelling Catheter - Urethral (mL): 150 mL  Total OUT: 210 mL    Total NET: -180 mL        ============================ LABS =========================                        10.2   14.72 )-----------( 116      ( 11 Nov 2022 02:15 )             28.9     11-11    140  |  106  |  12.9  ----------------------------<  103<H>  4.3   |  23.0  |  0.66    Ca    8.3<L>      11 Nov 2022 02:15  Phos  1.5     11-10  Mg     2.3     11-10    TPro  5.5<L>  /  Alb  4.3  /  TBili  0.9  /  DBili  x   /  AST  42<H>  /  ALT  13  /  AlkPhos  22<L>  11-11    LIVER FUNCTIONS - ( 11 Nov 2022 02:15 )  Alb: 4.3 g/dL / Pro: 5.5 g/dL / ALK PHOS: 22 U/L / ALT: 13 U/L / AST: 42 U/L / GGT: x           PT/INR - ( 11 Nov 2022 02:15 )   PT: 14.8 sec;   INR: 1.27 ratio         PTT - ( 11 Nov 2022 02:15 )  PTT:32.7 sec  ABG - ( 10 Nov 2022 18:47 )  pH, Arterial: 7.460 pH, Blood: x     /  pCO2: 35    /  pO2: 109   / HCO3: 25    / Base Excess: 1.1   /  SaO2: 99.1                ======================Micro/Rad/Cardio=================  Culture: Reviewed   CXR: Reviewed  Echo:Reviewed  ======================================================  PAST MEDICAL & SURGICAL HISTORY:  Hypertension      Palpitations      Mitral valve stenosis, moderate      Nonrheumatic mitral valve regurgitation      History of tonsillectomy      History of circumcision        ====================ASSESSMENT ==============  57 M with history of mitral regurgitation & hypertension presented through [facility]- now s.p Mitral valve repair - w/ triangular resection with annuloplasty and chord x- came out on levophed, brief post op AFib - now on amio;     ---Nonrheumatic mitral (valve) insufficiency.   ---s/p mitral repair  ---Post op Hypovolemia  ---Post op respiratory insufficiency       Plan:  -Lopressor 12.5 bid  -continue with asa   -pain control PRN   -protonix for gi proph   -senna and miralax for bowel regimen   -scd boot - lovenox  -chest pt      ====================== NEUROLOGY=====================  acetaminophen     Tablet .. 975 milliGRAM(s) Oral every 8 hours  ondansetron Injectable 4 milliGRAM(s) IV Push every 6 hours PRN Nausea and/or Vomiting  oxyCODONE    IR 5 milliGRAM(s) Oral every 4 hours PRN Moderate Pain (4 - 6)  oxyCODONE    IR 10 milliGRAM(s) Oral every 4 hours PRN Severe Pain (7 - 10)    ==================== RESPIRATORY======================  Post op respiratory insufficiency    ====================CARDIOVASCULAR==================  Post op Hypovolemia  aMIOdarone    Tablet   Oral   aMIOdarone    Tablet 400 milliGRAM(s) Oral every 8 hours  metoprolol tartrate 12.5 milliGRAM(s) Oral two times a day    ===================HEMATOLOGIC/ONC ===================  Monitor H&H/Plts    aspirin enteric coated 325 milliGRAM(s) Oral daily  enoxaparin Injectable 40 milliGRAM(s) SubCutaneous every 24 hours    ===================== RENAL =========================  Continue monitoring urine output, I&OS, BUN/Cr     ==================== GASTROINTESTINAL===================  ascorbic acid 500 milliGRAM(s) Oral daily  dextrose 10% Bolus 250 milliLiter(s) IV Bolus once  ferrous    sulfate 325 milliGRAM(s) Oral daily  folic acid 1 milliGRAM(s) Oral daily  multivitamin 1 Tablet(s) Oral daily  pantoprazole    Tablet 40 milliGRAM(s) Oral daily  polyethylene glycol 3350 17 Gram(s) Oral daily  psyllium Powder 1 Packet(s) Oral two times a day  senna 2 Tablet(s) Oral at bedtime  sodium chloride 0.9%. 1000 milliLiter(s) (10 mL/Hr) IV Continuous <Continuous>  sodium chloride 0.9%. 1000 milliLiter(s) (5 mL/Hr) IV Continuous <Continuous>    =======================    ENDOCRINE  =====================  atorvastatin 80 milliGRAM(s) Oral at bedtime  insulin lispro (ADMELOG) corrective regimen sliding scale   SubCutaneous Before meals and at bedtime    ========================INFECTIOUS DISEASE================  cefuroxime  IVPB 1500 milliGRAM(s) IV Intermittent every 8 hours  vancomycin  IVPB 1000 milliGRAM(s) IV Intermittent every 12 hours      -Close hemodynamic , ventilatory and drain monitoring and management per post op routine .  -Monitor Neurologic status ,   -Head of the bed should remain elevated to 45 degrees,  -Monitor adequacy of oxygenation and ventilation and attempt to wean oxygen ,  -Monitor for arrhythmias and monitor parameters for organ perfusion,  -Glycemic control is satisfactory,  -Nutritional goals will be met using po eventually , insure adequate caloric intake and monitor the same ,  -Electrolytes have been repleted as necessary , pain control has been achieved  and wound care has been carried out ,  -Stress ulcer and VTE prophylaxis will be achieved,  -Agressive PT and early mobility and ambulation goals will be met,  -The family was updated about the course and plan .      I have spent 35 minutes providing acute care for this critically ill patient     Patient requires continuous monitoring with bedside rhythm monitoring, pulse ox monitoring, and intermittent blood gas analysis. Care plan discussed with ICU care team. Patient remained critical and at risk for life threatening decompensation.

## 2022-11-11 NOTE — PROGRESS NOTE ADULT - SUBJECTIVE AND OBJECTIVE BOX
SUBJECTIVE   without acute complaints     INTERIM HISTORY SIGNIFICANT FOR   s/p MVrepair   came out on levophed, post op lactemia - since resolved       Patient is a 57y old  Male who presents with a chief complaint of MR (10 Nov 2022 11:59)    HPI:  Pt is a 57 years old male seen today pre-op for Mitral valve repair, possible replace. Pt recently presented to [facility] [place] January 6th for palpitations and shortness of breath. An echocardiogram was performed at that time revealing a "leaky valve". Pt  had followed up with Cardiology Dr. Chaudhari  post discharge and echocardiogram was performed revealing mitral valve dysfunction. Pt had Cardiac Cath on 9/15/22. Pt endorsed he has not seen a physician for many years. Pt  today report occasional episodes of palpitations, denies chest pain, peripheral edema, shortness of breath, exertional dyspnea, orthopnea and dizziness, lightheadedness, syncope fever/chills  and any other related issues. Pt seen today for a scheduled surgery on 11/10/2022 with Dr. Pollock.    (21 Oct 2022 08:44)    OBJECTIVE  PAST MEDICAL & SURGICAL HISTORY:  Hypertension  Palpitations  Mitral valve stenosis, moderate  Nonrheumatic mitral valve regurgitation  History of tonsillectomy  History of circumcision        No Known Allergies    Home Medications:  Aspir 81 oral delayed release tablet: 1 tab(s) orally once a day (10 Nov 2022 06:07)  ibuprofen:  (10 Nov 2022 06:07)  lisinopril 10 mg oral tablet: 1 tab(s) orally once a day (10 Nov 2022 06:07)  Metoprolol Tartrate 25 mg oral tablet: 1 tab(s) orally 2 times a day (10 Nov 2022 06:07)  Vitamin D3 50 mcg (2000 intl units) oral tablet: 1 tab(s) orally once a day (10 Nov 2022 06:07)    VITALS  Currently in sinus rhythm with vitals as below  ICU Vital Signs Last 24 Hrs  T(C): 37 (10 Nov 2022 20:00), Max: 37.1 (10 Nov 2022 15:00)  T(F): 98.6 (10 Nov 2022 20:00), Max: 98.8 (10 Nov 2022 15:00)  HR: 80 (10 Nov 2022 22:00) (60 - 105)  BP: 123/69 (10 Nov 2022 22:00) (88/58 - 134/80)  BP(mean): 90 (10 Nov 2022 22:00) (68 - 90)  ABP: 138/73 (10 Nov 2022 22:00) (42/29 - 140/73)  ABP(mean): 96 (10 Nov 2022 22:00) (34 - 101)  RR: 17 (10 Nov 2022 22:00) (9 - 26)  SpO2: 98% (10 Nov 2022 22:00) (94% - 100%)    O2 Parameters below as of 10 Nov 2022 21:00  Patient On (Oxygen Delivery Method): room air          Adult Advanced Hemodynamics Last 24 Hrs  CVP(mm Hg): 7 (10 Nov 2022 22:00) (2 - 19)  CVP(cm H2O): --  CO: --  CI: --  PA: --  PA(mean): --  PCWP: --  SVR: --  SVRI: --  PVR: --  PVRI: --  LABS                        10.2   14.72 )-----------( 116      ( 11 Nov 2022 02:15 )             28.9   PT/INR - ( 11 Nov 2022 02:15 )   PT: 14.8 sec;   INR: 1.27 ratio         PTT - ( 11 Nov 2022 02:15 )  PTT:32.7 sec11-10    140  |  105  |  13.8  ----------------------------<  164<H>  3.6   |  21.0<L>  |  0.54    Ca    7.5<L>      10 Nov 2022 11:38  Phos  1.5     11-10  Mg     2.3     11-10    TPro  5.0<L>  /  Alb  3.7  /  TBili  1.2  /  DBili  x   /  AST  35  /  ALT  13  /  AlkPhos  24<L>  11-10  CAPILLARY BLOOD GLUCOSE      POCT Blood Glucose.: 100 mg/dL (11 Nov 2022 02:13)  CARDIAC MARKERS ( 11 Nov 2022 02:15 )  x     / 0.45 ng/mL / x     / x     / x      CARDIAC MARKERS ( 10 Nov 2022 11:38 )  x     / 0.40 ng/mL / 249 U/L / x     / 36.4 ng/mL      ABG - ( 10 Nov 2022 18:47 )  pH, Arterial: 7.460 pH, Blood: x     /  pCO2: 35    /  pO2: 109   / HCO3: 25    / Base Excess: 1.1   /  SaO2: 99.1              Mode: standby    IN/OUT    11-10-22 @ 07:01  -  11-11-22 @ 02:58  --------------------------------------------------------  IN: 3168.5 mL / OUT: 2540 mL / NET: 628.5 mL      IMAGING  personally reviewed imaging   Xray Chest 1 View- PORTABLE-Urgent:   ACC: [id number] EXAM:  XR CHEST PORTABLE URGENT 1V                          PROCEDURE DATE:  11/10/2022          INTERPRETATION:  INDICATION: Status post cardiac surgery for orogastric   tube placement    COMPARISON: 11/10/2022    FINDINGS:  An AP portable semiupright view of the chest shows an orogastric tube   extending to the mid thoracic esophagus, approximately 6 cm below the   david. Further advancement is advised. A mediastinal drain is also   present. There is a right central line terminating in the superior vena   cava. There is an endotracheal tube terminating just below the thoracic   inlet. A prosthetic mitral valve is seen with midline sternal wires and   anterior cutaneous staples. Other findings include a calcified granuloma   within the lateral right lower lung, unchanged. There is no pneumothorax.   There are no pleural effusions. There is no hilar or mediastinal   widening. Heart size is normal, without CHF.    IMPRESSION:  1. The nasogastric tube terminates within the mid thoracic esophagus, 6   cm below the level of the david, requiring repositioning.  2. Postoperative changes including prosthetic mitral heart valve, midline   sternal wires, mediastinal drain, anterior cutaneous staples as noted   without complication.  3. Endotracheal tube terminates just below the thoracic inlet, 9 cm above   the david.  4. Central line terminates in the superior vena cava without pneumothorax.  5. Small calcified granuloma within the right lung, unchanged with   otherwiseclear lungs and no acute cardiopulmonary abnormalities.    --- End of Report ---            CIARA AN MD; Attending Radiologist  This document has been electronically signed. Nov 10 2022  3:22PM (11-10-22 @ 13:32)  Xray Chest 1 View- PORTABLE-Urgent:   ACC: [id number] EXAM:  XR CHEST PORTABLE URGENT 1V                          PROCEDURE DATE:  11/10/2022          INTERPRETATION:  Clinical history: 57-year-old male,OR    Single view of the chest is compared to 10/21/2022 and demonstrates that   the patient is post sternotomy/MVR    Normal cardiac silhouette and normal pulmonary vasculature with no   consolidation, effusion, pneumothorax or acute osseous finding.    Right IJ line with the tip in the SVC, tip of the ET tube 12 cm above the   david, tip of the NG tube in the mid esophagus. Mediastinal drains in   place.    IMPRESSION:  Post MVR.    Tip of the NG tube in the mid esophagus.    --- End of Report ---            LORETTA DUDLEY DO; Attending Radiologist  This document has been electronically signed. Nov 10 2022  3:30PM (11-10-22 @ 11:23)    CURRENT MEDICATIONS  MEDICATIONS  (STANDING):  acetaminophen     Tablet .. 975 milliGRAM(s) Oral every 8 hours  acetaminophen   IVPB .. 1000 milliGRAM(s) IV Intermittent once  aMIOdarone    Tablet   Oral   aMIOdarone    Tablet 400 milliGRAM(s) Oral every 8 hours  aspirin enteric coated 325 milliGRAM(s) Oral daily  atorvastatin 80 milliGRAM(s) Oral at bedtime  cefuroxime  IVPB 1500 milliGRAM(s) IV Intermittent every 8 hours  chlorhexidine 2% Cloths 1 Application(s) Topical daily  enoxaparin Injectable 40 milliGRAM(s) SubCutaneous every 24 hours  insulin lispro Injectable (ADMELOG) 2 Unit(s) SubCutaneous three times a day before meals  insulin regular Infusion 2 Unit(s)/Hr (2 mL/Hr) IV Continuous <Continuous>  pantoprazole    Tablet 40 milliGRAM(s) Oral daily  polyethylene glycol 3350 17 Gram(s) Oral daily  potassium chloride  10 mEq/50 mL IVPB 10 milliEquivalent(s) IV Intermittent every 1 hour  potassium chloride  10 mEq/50 mL IVPB 10 milliEquivalent(s) IV Intermittent every 1 hour  potassium chloride  10 mEq/50 mL IVPB 10 milliEquivalent(s) IV Intermittent every 1 hour  psyllium Powder 1 Packet(s) Oral two times a day  senna 2 Tablet(s) Oral at bedtime  sodium chloride 0.9%. 1000 milliLiter(s) (10 mL/Hr) IV Continuous <Continuous>  sodium chloride 0.9%. 1000 milliLiter(s) (5 mL/Hr) IV Continuous <Continuous>  vancomycin  IVPB 1000 milliGRAM(s) IV Intermittent every 12 hours    MEDICATIONS  (PRN):  ondansetron Injectable 4 milliGRAM(s) IV Push every 6 hours PRN Nausea and/or Vomiting  oxyCODONE    IR 5 milliGRAM(s) Oral every 4 hours PRN Moderate Pain (4 - 6)  oxyCODONE    IR 10 milliGRAM(s) Oral every 4 hours PRN Severe Pain (7 - 10)

## 2022-11-11 NOTE — PHYSICAL THERAPY INITIAL EVALUATION ADULT - ADDITIONAL COMMENTS
Pt AxOx4 states he live at home with wife in home with 1STE 0HR and  13steps upstairs with 1HR. Pt does not use or own DME and was independent PTA.  Wife home 24/7 to assist prn.

## 2022-11-11 NOTE — CHART NOTE - NSCHARTNOTEFT_GEN_A_CORE
[address] [facility]    [address] [place] [address]    Phone:  [phone]                                       After Visit Summary   4/30/2018    Tye Bertrand    MRN: [id number]           After Visit Summary Signature Page     I have received my discharge instructions, and my questions have been answered. I have discussed any challenges I see with this plan with the nurse or doctor.    ..........................................................................................................................................  Patient/Patient Representative Signature      ..........................................................................................................................................  Patient Representative Print Name and Relationship to Patient    ..................................................               ................................................  Date                                            Time    ..........................................................................................................................................  Reviewed by Signature/Title    ...................................................              ..............................................  Date                                                            Time           Patient seen and evaluated bedside; chart reviewed.   No longer warrants arterial femoral sheaths in the left groin; case discussed with team.   As such left femoral artery sheath d/c; direct pressure applied x 30 minutes; no note of hematoma/ecchymosis post.   Nurse and patient given post instruction; to maintain bedrest x 4 hours with sand bag in place; groin check q 15 minutes x 1 hour; q1 hour x 3 additional hours thereafter.    patient permitted to get out of bed to chair with goal of increasing activity as tolerated.

## 2022-11-12 DIAGNOSIS — I48.91 UNSPECIFIED ATRIAL FIBRILLATION: ICD-10-CM

## 2022-11-12 DIAGNOSIS — Z29.9 ENCOUNTER FOR PROPHYLACTIC MEASURES, UNSPECIFIED: ICD-10-CM

## 2022-11-12 DIAGNOSIS — I10 ESSENTIAL (PRIMARY) HYPERTENSION: ICD-10-CM

## 2022-11-12 LAB
ANION GAP SERPL CALC-SCNC: 9 MMOL/L — SIGNIFICANT CHANGE UP (ref 5–17)
BUN SERPL-MCNC: 15.2 MG/DL — SIGNIFICANT CHANGE UP (ref 8–20)
CALCIUM SERPL-MCNC: 8.3 MG/DL — LOW (ref 8.4–10.5)
CHLORIDE SERPL-SCNC: 105 MMOL/L — SIGNIFICANT CHANGE UP (ref 96–108)
CO2 SERPL-SCNC: 25 MMOL/L — SIGNIFICANT CHANGE UP (ref 22–29)
CREAT SERPL-MCNC: 0.66 MG/DL — SIGNIFICANT CHANGE UP (ref 0.5–1.3)
EGFR: 109 ML/MIN/1.73M2 — SIGNIFICANT CHANGE UP
GLUCOSE BLDC GLUCOMTR-MCNC: 111 MG/DL — HIGH (ref 70–99)
GLUCOSE BLDC GLUCOMTR-MCNC: 112 MG/DL — HIGH (ref 70–99)
GLUCOSE BLDC GLUCOMTR-MCNC: 118 MG/DL — HIGH (ref 70–99)
GLUCOSE BLDC GLUCOMTR-MCNC: 119 MG/DL — HIGH (ref 70–99)
GLUCOSE SERPL-MCNC: 123 MG/DL — HIGH (ref 70–99)
HCT VFR BLD CALC: 31.8 % — LOW (ref 39–50)
HGB BLD-MCNC: 10.9 G/DL — LOW (ref 13–17)
MAGNESIUM SERPL-MCNC: 1.8 MG/DL — SIGNIFICANT CHANGE UP (ref 1.6–2.6)
MCHC RBC-ENTMCNC: 31.3 PG — SIGNIFICANT CHANGE UP (ref 27–34)
MCHC RBC-ENTMCNC: 34.3 GM/DL — SIGNIFICANT CHANGE UP (ref 32–36)
MCV RBC AUTO: 91.4 FL — SIGNIFICANT CHANGE UP (ref 80–100)
PLATELET # BLD AUTO: 123 K/UL — LOW (ref 150–400)
POTASSIUM SERPL-MCNC: 4.4 MMOL/L — SIGNIFICANT CHANGE UP (ref 3.5–5.3)
POTASSIUM SERPL-SCNC: 4.4 MMOL/L — SIGNIFICANT CHANGE UP (ref 3.5–5.3)
RBC # BLD: 3.48 M/UL — LOW (ref 4.2–5.8)
RBC # FLD: 13 % — SIGNIFICANT CHANGE UP (ref 10.3–14.5)
SODIUM SERPL-SCNC: 139 MMOL/L — SIGNIFICANT CHANGE UP (ref 135–145)
WBC # BLD: 17.88 K/UL — HIGH (ref 3.8–10.5)
WBC # FLD AUTO: 17.88 K/UL — HIGH (ref 3.8–10.5)

## 2022-11-12 PROCEDURE — 71045 X-RAY EXAM CHEST 1 VIEW: CPT | Mod: 26

## 2022-11-12 PROCEDURE — 93010 ELECTROCARDIOGRAM REPORT: CPT

## 2022-11-12 PROCEDURE — 99024 POSTOP FOLLOW-UP VISIT: CPT

## 2022-11-12 RX ORDER — SODIUM CHLORIDE 9 MG/ML
3 INJECTION INTRAMUSCULAR; INTRAVENOUS; SUBCUTANEOUS EVERY 8 HOURS
Refills: 0 | Status: DISCONTINUED | OUTPATIENT
Start: 2022-11-12 | End: 2022-11-15

## 2022-11-12 RX ORDER — MAGNESIUM SULFATE 500 MG/ML
2 VIAL (ML) INJECTION ONCE
Refills: 0 | Status: COMPLETED | OUTPATIENT
Start: 2022-11-12 | End: 2022-11-12

## 2022-11-12 RX ADMIN — Medication 100 MILLIGRAM(S): at 07:51

## 2022-11-12 RX ADMIN — Medication 12.5 MILLIGRAM(S): at 18:16

## 2022-11-12 RX ADMIN — SENNA PLUS 2 TABLET(S): 8.6 TABLET ORAL at 21:08

## 2022-11-12 RX ADMIN — AMIODARONE HYDROCHLORIDE 400 MILLIGRAM(S): 400 TABLET ORAL at 14:12

## 2022-11-12 RX ADMIN — Medication 1 PACKET(S): at 06:12

## 2022-11-12 RX ADMIN — Medication 1 MILLIGRAM(S): at 11:21

## 2022-11-12 RX ADMIN — Medication 12.5 MILLIGRAM(S): at 06:11

## 2022-11-12 RX ADMIN — SODIUM CHLORIDE 3 MILLILITER(S): 9 INJECTION INTRAMUSCULAR; INTRAVENOUS; SUBCUTANEOUS at 13:02

## 2022-11-12 RX ADMIN — Medication 325 MILLIGRAM(S): at 11:21

## 2022-11-12 RX ADMIN — Medication 1 PACKET(S): at 18:17

## 2022-11-12 RX ADMIN — Medication 975 MILLIGRAM(S): at 21:30

## 2022-11-12 RX ADMIN — PANTOPRAZOLE SODIUM 40 MILLIGRAM(S): 20 TABLET, DELAYED RELEASE ORAL at 11:21

## 2022-11-12 RX ADMIN — Medication 975 MILLIGRAM(S): at 15:13

## 2022-11-12 RX ADMIN — Medication 25 GRAM(S): at 05:28

## 2022-11-12 RX ADMIN — ATORVASTATIN CALCIUM 80 MILLIGRAM(S): 80 TABLET, FILM COATED ORAL at 21:08

## 2022-11-12 RX ADMIN — Medication 975 MILLIGRAM(S): at 06:11

## 2022-11-12 RX ADMIN — AMIODARONE HYDROCHLORIDE 400 MILLIGRAM(S): 400 TABLET ORAL at 06:12

## 2022-11-12 RX ADMIN — SODIUM CHLORIDE 3 MILLILITER(S): 9 INJECTION INTRAMUSCULAR; INTRAVENOUS; SUBCUTANEOUS at 20:47

## 2022-11-12 RX ADMIN — Medication 325 MILLIGRAM(S): at 11:20

## 2022-11-12 RX ADMIN — Medication 250 MILLIGRAM(S): at 07:47

## 2022-11-12 RX ADMIN — Medication 975 MILLIGRAM(S): at 06:50

## 2022-11-12 RX ADMIN — Medication 1 TABLET(S): at 11:21

## 2022-11-12 RX ADMIN — POLYETHYLENE GLYCOL 3350 17 GRAM(S): 17 POWDER, FOR SOLUTION ORAL at 11:21

## 2022-11-12 RX ADMIN — ENOXAPARIN SODIUM 40 MILLIGRAM(S): 100 INJECTION SUBCUTANEOUS at 09:30

## 2022-11-12 RX ADMIN — Medication 975 MILLIGRAM(S): at 21:07

## 2022-11-12 RX ADMIN — Medication 975 MILLIGRAM(S): at 14:13

## 2022-11-12 RX ADMIN — Medication 500 MILLIGRAM(S): at 11:21

## 2022-11-12 RX ADMIN — Medication 100 MILLIGRAM(S): at 00:39

## 2022-11-12 NOTE — DIETITIAN INITIAL EVALUATION ADULT - OTHER INFO
57 years old male seen today pre-op for Mitral valve repair, possible replace. [place] [facility] [place]ations and shortness of breath. An echocardiogram was performed at that time revealing a "leaky valve".  Pt reports poor intake prior to surgery, but intake intake /appetite is improving.

## 2022-11-12 NOTE — PROGRESS NOTE ADULT - SUBJECTIVE AND OBJECTIVE BOX
Subjective - patient seen and evaluated bedside. Sitting comfortably in bed. Denies CP, SOB, HA, dizziness, n/v/d    Review of Systems: negative x 10 systems except as noted above    Brief summary:  57yMale POD# 2 Mitral valve repair    Significant/[id number] events:  BB, amio started for afib    PAST MEDICAL & SURGICAL HISTORY:  Hypertension      Palpitations      Mitral valve stenosis, moderate      Nonrheumatic mitral valve regurgitation      History of tonsillectomy      History of circumcision            acetaminophen     Tablet .. 975 milliGRAM(s) Oral every 8 hours  aMIOdarone    Tablet   Oral   aMIOdarone    Tablet 400 milliGRAM(s) Oral every 8 hours  ascorbic acid 500 milliGRAM(s) Oral daily  aspirin enteric coated 325 milliGRAM(s) Oral daily  atorvastatin 80 milliGRAM(s) Oral at bedtime  cefuroxime  IVPB 1500 milliGRAM(s) IV Intermittent every 8 hours  chlorhexidine 2% Cloths 1 Application(s) Topical daily  dextrose 10% Bolus 250 milliLiter(s) IV Bolus once  enoxaparin Injectable 40 milliGRAM(s) SubCutaneous every 24 hours  ferrous    sulfate 325 milliGRAM(s) Oral daily  folic acid 1 milliGRAM(s) Oral daily  influenza   Vaccine 0.5 milliLiter(s) IntraMuscular once  insulin lispro (ADMELOG) corrective regimen sliding scale   SubCutaneous Before meals and at bedtime  metoprolol tartrate 12.5 milliGRAM(s) Oral two times a day  multivitamin 1 Tablet(s) Oral daily  ondansetron Injectable 4 milliGRAM(s) IV Push every 6 hours PRN  oxyCODONE    IR 5 milliGRAM(s) Oral every 4 hours PRN  oxyCODONE    IR 10 milliGRAM(s) Oral every 4 hours PRN  pantoprazole    Tablet 40 milliGRAM(s) Oral daily  polyethylene glycol 3350 17 Gram(s) Oral daily  psyllium Powder 1 Packet(s) Oral two times a day  senna 2 Tablet(s) Oral at bedtime  sodium chloride 0.9%. 1000 milliLiter(s) IV Continuous <Continuous>  sodium chloride 0.9%. 1000 milliLiter(s) IV Continuous <Continuous>  vancomycin  IVPB 1000 milliGRAM(s) IV Intermittent every 12 hours  MEDICATIONS  (PRN):  ondansetron Injectable 4 milliGRAM(s) IV Push every 6 hours PRN Nausea and/or Vomiting  oxyCODONE    IR 5 milliGRAM(s) Oral every 4 hours PRN Moderate Pain (4 - 6)  oxyCODONE    IR 10 milliGRAM(s) Oral every 4 hours PRN Severe Pain (7 - 10)      Daily     Daily       ABG - ( 10 Nov 2022 18:47 )  pH, Arterial: 7.460 pH, Blood: x     /  pCO2: 35    /  pO2: 109   / HCO3: 25    / Base Excess: 1.1   /  SaO2: 99.1                                    10.2   14.72 )-----------( 116      ( 11 Nov 2022 02:15 )             28.9   11-11    140  |  106  |  12.9  ----------------------------<  103<H>  4.3   |  23.0  |  0.66    Ca    8.3<L>      11 Nov 2022 02:15  Phos  1.5     11-10  Mg     2.3     11-10    TPro  5.5<L>  /  Alb  4.3  /  TBili  0.9  /  DBili  x   /  AST  42<H>  /  ALT  13  /  AlkPhos  22<L>  11-11    CARDIAC MARKERS ( 11 Nov 2022 02:15 )  x     / 0.45 ng/mL / 367 U/L / x     / 30.4 ng/mL  CARDIAC MARKERS ( 10 Nov 2022 11:38 )  x     / 0.40 ng/mL / 249 U/L / x     / 36.4 ng/mL    PT/INR - ( 11 Nov 2022 02:15 )   PT: 14.8 sec;   INR: 1.27 ratio         PTT - ( 11 Nov 2022 02:15 )  PTT:32.7 sec      Objective:  T(C): 37 (11-12-22 @ 00:00), Max: 37.6 (11-11-22 @ 12:00)  HR: 68 (11-12-22 @ 00:00) (67 - 87)  BP: 123/73 (11-12-22 @ 00:00) (90/52 - 124/72)  RR: 15 (11-12-22 @ 00:00) (13 - 27)  SpO2: 100% (11-12-22 @ 00:00) (97% - 100%)  Wt(kg): --CAPILLARY BLOOD GLUCOSE      POCT Blood Glucose.: 143 mg/dL (11 Nov 2022 21:42)  POCT Blood Glucose.: 140 mg/dL (11 Nov 2022 16:45)  POCT Blood Glucose.: 132 mg/dL (11 Nov 2022 11:43)  POCT Blood Glucose.: 104 mg/dL (11 Nov 2022 08:12)  POCT Blood Glucose.: 105 mg/dL (11 Nov 2022 07:01)  POCT Blood Glucose.: 110 mg/dL (11 Nov 2022 06:06)  POCT Blood Glucose.: 98 mg/dL (11 Nov 2022 05:25)  POCT Blood Glucose.: 101 mg/dL (11 Nov 2022 03:17)  POCT Blood Glucose.: 44 mg/dL (11 Nov 2022 03:15)  POCT Blood Glucose.: 100 mg/dL (11 Nov 2022 02:13)  POCT Blood Glucose.: 113 mg/dL (11 Nov 2022 00:33)  I&O's Summary    10 Nov 2022 07:01  -  11 Nov 2022 07:00  --------------------------------------------------------  IN: 3278 mL / OUT: 3360 mL / NET: -82 mL    11 Nov 2022 07:01  -  12 Nov 2022 00:23  --------------------------------------------------------  IN: 255 mL / OUT: 1660 mL / NET: -1405 mL        Physical Exam  General: NAD  Neuro: A+O x 3, non-focal, speech clear and intact  Psych: Appropriate affect  HEENT:  NCAT, No conjuctival edema or icterus, no thrush.  Pulm: CTA, equal bilaterally  CV: RRR,  +S1S2  Abd: soft, NT, ND, +BS  Ext: +DP Pulses b/l, no edema  Skin: Warm, dry, intact  Inc: MSI C/D/I/stable w/ dressing,   Chest tubes: mediastinal chest tubes to suction, draining appropriately, no AL.         Imaging:      < from: Xray Chest 1 View- PORTABLE-Routine (11.11.22 @ 06:04) >  Post sternotomy/MVR.    IMPRESSION:  ET and NG tubes removed    --- End of Report ---    < end of copied text >

## 2022-11-12 NOTE — DIETITIAN INITIAL EVALUATION ADULT - PERTINENT MEDS FT
MEDICATIONS  (STANDING):  acetaminophen     Tablet .. 975 milliGRAM(s) Oral every 8 hours  aMIOdarone    Tablet   Oral   aMIOdarone    Tablet 400 milliGRAM(s) Oral every 8 hours  ascorbic acid 500 milliGRAM(s) Oral daily  aspirin enteric coated 325 milliGRAM(s) Oral daily  atorvastatin 80 milliGRAM(s) Oral at bedtime  dextrose 10% Bolus 250 milliLiter(s) IV Bolus once  enoxaparin Injectable 40 milliGRAM(s) SubCutaneous every 24 hours  ferrous    sulfate 325 milliGRAM(s) Oral daily  folic acid 1 milliGRAM(s) Oral daily  influenza   Vaccine 0.5 milliLiter(s) IntraMuscular once  insulin lispro (ADMELOG) corrective regimen sliding scale   SubCutaneous Before meals and at bedtime  metoprolol tartrate 12.5 milliGRAM(s) Oral two times a day  multivitamin 1 Tablet(s) Oral daily  pantoprazole    Tablet 40 milliGRAM(s) Oral daily  polyethylene glycol 3350 17 Gram(s) Oral daily  psyllium Powder 1 Packet(s) Oral two times a day  senna 2 Tablet(s) Oral at bedtime  sodium chloride 0.9% lock flush 3 milliLiter(s) IV Push every 8 hours  sodium chloride 0.9%. 1000 milliLiter(s) (10 mL/Hr) IV Continuous <Continuous>  sodium chloride 0.9%. 1000 milliLiter(s) (5 mL/Hr) IV Continuous <Continuous>    MEDICATIONS  (PRN):  ondansetron Injectable 4 milliGRAM(s) IV Push every 6 hours PRN Nausea and/or Vomiting  oxyCODONE    IR 5 milliGRAM(s) Oral every 4 hours PRN Moderate Pain (4 - 6)  oxyCODONE    IR 10 milliGRAM(s) Oral every 4 hours PRN Severe Pain (7 - 10)

## 2022-11-12 NOTE — DIETITIAN INITIAL EVALUATION ADULT - NSFNSGIIOFT_GEN_A_CORE
11-11-22 @ 07:01  -  11-12-22 @ 07:00  --------------------------------------------------------  OUT:    Chest Tube (mL): 210 mL  Total OUT: 210 mL    Total NET: -210 mL

## 2022-11-12 NOTE — DIETITIAN INITIAL EVALUATION ADULT - PERTINENT LABORATORY DATA
11-12    139  |  105  |  15.2  ----------------------------<  123<H>  4.4   |  25.0  |  0.66    Ca    8.3<L>      12 Nov 2022 03:35  Phos  1.5     11-10  Mg     1.8     11-12    TPro  5.5<L>  /  Alb  4.3  /  TBili  0.9  /  DBili  x   /  AST  42<H>  /  ALT  13  /  AlkPhos  22<L>  11-11  POCT Blood Glucose.: 118 mg/dL (11-12-22 @ 11:19)  A1C with Estimated Average Glucose Result: 5.2 % (10-21-22 @ 09:40)  A1C with Estimated Average Glucose Result: 5.1 % (09-15-22 @ 13:30)

## 2022-11-13 LAB
ANION GAP SERPL CALC-SCNC: 11 MMOL/L — SIGNIFICANT CHANGE UP (ref 5–17)
BASOPHILS # BLD AUTO: 0.02 K/UL — SIGNIFICANT CHANGE UP (ref 0–0.2)
BASOPHILS NFR BLD AUTO: 0.2 % — SIGNIFICANT CHANGE UP (ref 0–2)
BUN SERPL-MCNC: 16.7 MG/DL — SIGNIFICANT CHANGE UP (ref 8–20)
CALCIUM SERPL-MCNC: 8.6 MG/DL — SIGNIFICANT CHANGE UP (ref 8.4–10.5)
CHLORIDE SERPL-SCNC: 103 MMOL/L — SIGNIFICANT CHANGE UP (ref 96–108)
CO2 SERPL-SCNC: 27 MMOL/L — SIGNIFICANT CHANGE UP (ref 22–29)
CREAT SERPL-MCNC: 0.73 MG/DL — SIGNIFICANT CHANGE UP (ref 0.5–1.3)
EGFR: 106 ML/MIN/1.73M2 — SIGNIFICANT CHANGE UP
EOSINOPHIL # BLD AUTO: 0.04 K/UL — SIGNIFICANT CHANGE UP (ref 0–0.5)
EOSINOPHIL NFR BLD AUTO: 0.4 % — SIGNIFICANT CHANGE UP (ref 0–6)
GLUCOSE BLDC GLUCOMTR-MCNC: 103 MG/DL — HIGH (ref 70–99)
GLUCOSE BLDC GLUCOMTR-MCNC: 110 MG/DL — HIGH (ref 70–99)
GLUCOSE BLDC GLUCOMTR-MCNC: 91 MG/DL — SIGNIFICANT CHANGE UP (ref 70–99)
GLUCOSE BLDC GLUCOMTR-MCNC: 96 MG/DL — SIGNIFICANT CHANGE UP (ref 70–99)
GLUCOSE SERPL-MCNC: 99 MG/DL — SIGNIFICANT CHANGE UP (ref 70–99)
HCT VFR BLD CALC: 32.1 % — LOW (ref 39–50)
HGB BLD-MCNC: 11.1 G/DL — LOW (ref 13–17)
IMM GRANULOCYTES NFR BLD AUTO: 1.1 % — HIGH (ref 0–0.9)
LYMPHOCYTES # BLD AUTO: 1.64 K/UL — SIGNIFICANT CHANGE UP (ref 1–3.3)
LYMPHOCYTES # BLD AUTO: 14.4 % — SIGNIFICANT CHANGE UP (ref 13–44)
MAGNESIUM SERPL-MCNC: 1.9 MG/DL — SIGNIFICANT CHANGE UP (ref 1.8–2.6)
MCHC RBC-ENTMCNC: 31.6 PG — SIGNIFICANT CHANGE UP (ref 27–34)
MCHC RBC-ENTMCNC: 34.6 GM/DL — SIGNIFICANT CHANGE UP (ref 32–36)
MCV RBC AUTO: 91.5 FL — SIGNIFICANT CHANGE UP (ref 80–100)
MONOCYTES # BLD AUTO: 0.9 K/UL — SIGNIFICANT CHANGE UP (ref 0–0.9)
MONOCYTES NFR BLD AUTO: 7.9 % — SIGNIFICANT CHANGE UP (ref 2–14)
NEUTROPHILS # BLD AUTO: 8.63 K/UL — HIGH (ref 1.8–7.4)
NEUTROPHILS NFR BLD AUTO: 76 % — SIGNIFICANT CHANGE UP (ref 43–77)
PLATELET # BLD AUTO: 148 K/UL — LOW (ref 150–400)
POTASSIUM SERPL-MCNC: 4 MMOL/L — SIGNIFICANT CHANGE UP (ref 3.5–5.3)
POTASSIUM SERPL-SCNC: 4 MMOL/L — SIGNIFICANT CHANGE UP (ref 3.5–5.3)
RBC # BLD: 3.51 M/UL — LOW (ref 4.2–5.8)
RBC # FLD: 12.8 % — SIGNIFICANT CHANGE UP (ref 10.3–14.5)
SODIUM SERPL-SCNC: 141 MMOL/L — SIGNIFICANT CHANGE UP (ref 135–145)
WBC # BLD: 11.35 K/UL — HIGH (ref 3.8–10.5)
WBC # FLD AUTO: 11.35 K/UL — HIGH (ref 3.8–10.5)

## 2022-11-13 PROCEDURE — 71045 X-RAY EXAM CHEST 1 VIEW: CPT | Mod: 26

## 2022-11-13 PROCEDURE — 99024 POSTOP FOLLOW-UP VISIT: CPT

## 2022-11-13 PROCEDURE — 93010 ELECTROCARDIOGRAM REPORT: CPT

## 2022-11-13 RX ORDER — MAGNESIUM SULFATE 500 MG/ML
2 VIAL (ML) INJECTION ONCE
Refills: 0 | Status: COMPLETED | OUTPATIENT
Start: 2022-11-13 | End: 2022-11-13

## 2022-11-13 RX ADMIN — Medication 1 MILLIGRAM(S): at 11:09

## 2022-11-13 RX ADMIN — SODIUM CHLORIDE 3 MILLILITER(S): 9 INJECTION INTRAMUSCULAR; INTRAVENOUS; SUBCUTANEOUS at 14:04

## 2022-11-13 RX ADMIN — SODIUM CHLORIDE 3 MILLILITER(S): 9 INJECTION INTRAMUSCULAR; INTRAVENOUS; SUBCUTANEOUS at 21:00

## 2022-11-13 RX ADMIN — POLYETHYLENE GLYCOL 3350 17 GRAM(S): 17 POWDER, FOR SOLUTION ORAL at 11:10

## 2022-11-13 RX ADMIN — Medication 12.5 MILLIGRAM(S): at 16:10

## 2022-11-13 RX ADMIN — ENOXAPARIN SODIUM 40 MILLIGRAM(S): 100 INJECTION SUBCUTANEOUS at 21:37

## 2022-11-13 RX ADMIN — SENNA PLUS 2 TABLET(S): 8.6 TABLET ORAL at 21:38

## 2022-11-13 RX ADMIN — Medication 325 MILLIGRAM(S): at 11:09

## 2022-11-13 RX ADMIN — Medication 25 GRAM(S): at 11:10

## 2022-11-13 RX ADMIN — Medication 1 PACKET(S): at 05:18

## 2022-11-13 RX ADMIN — Medication 500 MILLIGRAM(S): at 11:09

## 2022-11-13 RX ADMIN — Medication 12.5 MILLIGRAM(S): at 05:17

## 2022-11-13 RX ADMIN — Medication 1 TABLET(S): at 11:10

## 2022-11-13 RX ADMIN — PANTOPRAZOLE SODIUM 40 MILLIGRAM(S): 20 TABLET, DELAYED RELEASE ORAL at 11:09

## 2022-11-13 RX ADMIN — SODIUM CHLORIDE 3 MILLILITER(S): 9 INJECTION INTRAMUSCULAR; INTRAVENOUS; SUBCUTANEOUS at 05:19

## 2022-11-13 RX ADMIN — ATORVASTATIN CALCIUM 80 MILLIGRAM(S): 80 TABLET, FILM COATED ORAL at 21:38

## 2022-11-13 NOTE — PROGRESS NOTE ADULT - SUBJECTIVE AND OBJECTIVE BOX
Significant recent/past 24 hr events:  No acute overnight events. Pt remained HD stable with without acute complaints.  Pt currently in NAD and denies N/V/D HA, dizziness, blurry vision, numbness/tingling, SOB, cough, chest pain, palpitations, abd pain or urinary sx's.     Subjective:  Review of Systems  ROS negative x 10 systems except as noted above    Patient is a 57y old  Male who presents with a chief complaint of Nonrheumatic mitral valve regurgitation     (12 Nov 2022 11:34)    HPI:  Pt is a 57 years old male seen today pre-op for Mitral valve repair, possible replace. Pt recently presented to [facility] [place] January 6th for palpitations and shortness of breath. An echocardiogram was performed at that time revealing a "leaky valve". Pt  had followed up with Cardiology Dr. Chaudhari  post discharge and echocardiogram was performed revealing mitral valve dysfunction. Pt had Cardiac Cath on 9/15/22. Pt endorsed he has not seen a physician for many years. Pt  today report occasional episodes of palpitations, denies chest pain, peripheral edema, shortness of breath, exertional dyspnea, orthopnea and dizziness, lightheadedness, syncope fever/chills  and any other related issues. Pt seen today for a scheduled surgery on 11/10/2022 with Dr. Pollock.    (21 Oct 2022 08:44)    PAST MEDICAL & SURGICAL HISTORY:  Hypertension      Palpitations      Mitral valve stenosis, moderate      Nonrheumatic mitral valve regurgitation      History of tonsillectomy      History of circumcision        FAMILY HISTORY:  Family history of atrial fibrillation (Mother)    FHx: esophageal cancer (Father)        Vitals   ICU Vital Signs Last 24 Hrs  T(C): 36.8 (12 Nov 2022 22:19), Max: 37 (12 Nov 2022 04:00)  T(F): 98.3 (12 Nov 2022 22:19), Max: 98.6 (12 Nov 2022 04:00)  HR: 66 (12 Nov 2022 22:19) (64 - 79)  BP: 120/78 (12 Nov 2022 22:19) (95/53 - 129/69)  BP(mean): 92 (12 Nov 2022 22:19) (69 - 93)  ABP: --  ABP(mean): --  RR: 18 (12 Nov 2022 22:19) (13 - 22)  SpO2: 96% (12 Nov 2022 22:19) (96% - 100%)    O2 Parameters below as of 12 Nov 2022 22:19  Patient On (Oxygen Delivery Method): room air        I&O's Detail    11 Nov 2022 07:01  -  12 Nov 2022 07:00  --------------------------------------------------------  IN:    sodium chloride 0.9%: 120 mL    sodium chloride 0.9%: 240 mL  Total IN: 360 mL    OUT:    Chest Tube (mL): 210 mL    Indwelling Catheter - Urethral (mL): 1905 mL  Total OUT: 2115 mL    Total NET: -1755 mL      12 Nov 2022 07:01  -  13 Nov 2022 00:52  --------------------------------------------------------  IN:    Oral Fluid: 340 mL  Total IN: 340 mL    OUT:    Voided (mL): 600 mL  Total OUT: 600 mL    Total NET: -260 mL        LABS                        10.9   17.88 )-----------( 123      ( 12 Nov 2022 03:35 )             31.8     11-12    139  |  105  |  15.2  ----------------------------<  123<H>  4.4   |  25.0  |  0.66    Ca    8.3<L>      12 Nov 2022 03:35  Mg     1.8     11-12    TPro  5.5<L>  /  Alb  4.3  /  TBili  0.9  /  DBili  x   /  AST  42<H>  /  ALT  13  /  AlkPhos  22<L>  11-11    LIVER FUNCTIONS - ( 11 Nov 2022 02:15 )  Alb: 4.3 g/dL / Pro: 5.5 g/dL / ALK PHOS: 22 U/L / ALT: 13 U/L / AST: 42 U/L / GGT: x           PT/INR - ( 11 Nov 2022 02:15 )   PT: 14.8 sec;   INR: 1.27 ratio         PTT - ( 11 Nov 2022 02:15 )  PTT:32.7 sec        POCT Blood Glucose.: 119 mg/dL (11-12-22 @ 20:52)  POCT Blood Glucose.: 111 mg/dL (11-12-22 @ 16:09)  POCT Blood Glucose.: 118 mg/dL (11-12-22 @ 11:19)  POCT Blood Glucose.: 112 mg/dL (11-12-22 @ 07:36)      MEDICATIONS  (STANDING):  ascorbic acid 500 milliGRAM(s) Oral daily  aspirin enteric coated 325 milliGRAM(s) Oral daily  atorvastatin 80 milliGRAM(s) Oral at bedtime  dextrose 10% Bolus 250 milliLiter(s) IV Bolus once  enoxaparin Injectable 40 milliGRAM(s) SubCutaneous every 24 hours  ferrous    sulfate 325 milliGRAM(s) Oral daily  folic acid 1 milliGRAM(s) Oral daily  influenza   Vaccine 0.5 milliLiter(s) IntraMuscular once  insulin lispro (ADMELOG) corrective regimen sliding scale   SubCutaneous Before meals and at bedtime  metoprolol tartrate 12.5 milliGRAM(s) Oral two times a day  multivitamin 1 Tablet(s) Oral daily  pantoprazole    Tablet 40 milliGRAM(s) Oral daily  polyethylene glycol 3350 17 Gram(s) Oral daily  psyllium Powder 1 Packet(s) Oral two times a day  senna 2 Tablet(s) Oral at bedtime  sodium chloride 0.9% lock flush 3 milliLiter(s) IV Push every 8 hours    MEDICATIONS  (PRN):  acetaminophen     Tablet .. 650 milliGRAM(s) Oral every 6 hours PRN Temp greater or equal to 38C (100.4F), Mild Pain (1 - 3)  ondansetron Injectable 4 milliGRAM(s) IV Push every 6 hours PRN Nausea and/or Vomiting  oxyCODONE    IR 5 milliGRAM(s) Oral every 4 hours PRN Moderate Pain (4 - 6)  oxyCODONE    IR 10 milliGRAM(s) Oral every 4 hours PRN Severe Pain (7 - 10)      Allergies:  No Known Allergies      Physical Exam:   Constitutional: NAD  Neck: supple, trachea midline.  No JVD  Respiratory: Breath Sounds equal & clear bilaterally to auscultation, no accessory muscle use noted. No wheezing, rales, or rhonchi noted b/l  Cardiovascular: Junctional, normal S1, S2; no murmurs or rub  Gastrointestinal: Soft, non-tender, non distended, normal bowel sounds  Extremities: ZHANG x 4, mild LE peripheral edema, no cyanosis, no clubbing   Vascular: Equal and normal pulses: 2+ peripheral pulses throughout  Neurological: A+O x 3; speech clear and intact; no gross sensory/motor deficits  Psychiatric: calm, normal mood, normal affect  Skin: warm, dry, well perfused, no rashes  Tubes/Lines: Removed-sites C/D/I, without signs of gross bleeding or hematoma.  Incision: Sternal stable-No audible click,  incision C/D/I without obvious bleeding/discharge.  [id number]       Code Status: Full Code

## 2022-11-14 ENCOUNTER — TRANSCRIPTION ENCOUNTER (OUTPATIENT)
Age: 57
End: 2022-11-14

## 2022-11-14 LAB
ANION GAP SERPL CALC-SCNC: 13 MMOL/L — SIGNIFICANT CHANGE UP (ref 5–17)
BUN SERPL-MCNC: 14.5 MG/DL — SIGNIFICANT CHANGE UP (ref 8–20)
CALCIUM SERPL-MCNC: 8.4 MG/DL — SIGNIFICANT CHANGE UP (ref 8.4–10.5)
CHLORIDE SERPL-SCNC: 107 MMOL/L — SIGNIFICANT CHANGE UP (ref 96–108)
CO2 SERPL-SCNC: 24 MMOL/L — SIGNIFICANT CHANGE UP (ref 22–29)
CREAT SERPL-MCNC: 0.73 MG/DL — SIGNIFICANT CHANGE UP (ref 0.5–1.3)
EGFR: 106 ML/MIN/1.73M2 — SIGNIFICANT CHANGE UP
GLUCOSE BLDC GLUCOMTR-MCNC: 100 MG/DL — HIGH (ref 70–99)
GLUCOSE BLDC GLUCOMTR-MCNC: 145 MG/DL — HIGH (ref 70–99)
GLUCOSE SERPL-MCNC: 87 MG/DL — SIGNIFICANT CHANGE UP (ref 70–99)
HCT VFR BLD CALC: 33.1 % — LOW (ref 39–50)
HGB BLD-MCNC: 11.2 G/DL — LOW (ref 13–17)
INR BLD: 1.13 RATIO — SIGNIFICANT CHANGE UP (ref 0.88–1.16)
MAGNESIUM SERPL-MCNC: 1.8 MG/DL — SIGNIFICANT CHANGE UP (ref 1.8–2.6)
MCHC RBC-ENTMCNC: 31.1 PG — SIGNIFICANT CHANGE UP (ref 27–34)
MCHC RBC-ENTMCNC: 33.8 GM/DL — SIGNIFICANT CHANGE UP (ref 32–36)
MCV RBC AUTO: 91.9 FL — SIGNIFICANT CHANGE UP (ref 80–100)
PLATELET # BLD AUTO: 179 K/UL — SIGNIFICANT CHANGE UP (ref 150–400)
POTASSIUM SERPL-MCNC: 3.7 MMOL/L — SIGNIFICANT CHANGE UP (ref 3.5–5.3)
POTASSIUM SERPL-SCNC: 3.7 MMOL/L — SIGNIFICANT CHANGE UP (ref 3.5–5.3)
PROTHROM AB SERPL-ACNC: 13.1 SEC — SIGNIFICANT CHANGE UP (ref 10.5–13.4)
RBC # BLD: 3.6 M/UL — LOW (ref 4.2–5.8)
RBC # FLD: 12.4 % — SIGNIFICANT CHANGE UP (ref 10.3–14.5)
SODIUM SERPL-SCNC: 143 MMOL/L — SIGNIFICANT CHANGE UP (ref 135–145)
WBC # BLD: 8.14 K/UL — SIGNIFICANT CHANGE UP (ref 3.8–10.5)
WBC # FLD AUTO: 8.14 K/UL — SIGNIFICANT CHANGE UP (ref 3.8–10.5)

## 2022-11-14 PROCEDURE — 71045 X-RAY EXAM CHEST 1 VIEW: CPT | Mod: 26

## 2022-11-14 RX ORDER — POTASSIUM CHLORIDE 20 MEQ
40 PACKET (EA) ORAL ONCE
Refills: 0 | Status: COMPLETED | OUTPATIENT
Start: 2022-11-14 | End: 2022-11-14

## 2022-11-14 RX ORDER — MAGNESIUM SULFATE 500 MG/ML
2 VIAL (ML) INJECTION ONCE
Refills: 0 | Status: COMPLETED | OUTPATIENT
Start: 2022-11-14 | End: 2022-11-14

## 2022-11-14 RX ADMIN — Medication 12.5 MILLIGRAM(S): at 17:32

## 2022-11-14 RX ADMIN — Medication 12.5 MILLIGRAM(S): at 06:22

## 2022-11-14 RX ADMIN — Medication 1 MILLIGRAM(S): at 09:17

## 2022-11-14 RX ADMIN — Medication 500 MILLIGRAM(S): at 09:17

## 2022-11-14 RX ADMIN — Medication 1 TABLET(S): at 09:17

## 2022-11-14 RX ADMIN — SODIUM CHLORIDE 3 MILLILITER(S): 9 INJECTION INTRAMUSCULAR; INTRAVENOUS; SUBCUTANEOUS at 14:23

## 2022-11-14 RX ADMIN — SENNA PLUS 2 TABLET(S): 8.6 TABLET ORAL at 21:55

## 2022-11-14 RX ADMIN — PANTOPRAZOLE SODIUM 40 MILLIGRAM(S): 20 TABLET, DELAYED RELEASE ORAL at 09:17

## 2022-11-14 RX ADMIN — SODIUM CHLORIDE 3 MILLILITER(S): 9 INJECTION INTRAMUSCULAR; INTRAVENOUS; SUBCUTANEOUS at 21:54

## 2022-11-14 RX ADMIN — SODIUM CHLORIDE 3 MILLILITER(S): 9 INJECTION INTRAMUSCULAR; INTRAVENOUS; SUBCUTANEOUS at 08:06

## 2022-11-14 RX ADMIN — Medication 325 MILLIGRAM(S): at 09:17

## 2022-11-14 RX ADMIN — Medication 25 GRAM(S): at 09:18

## 2022-11-14 RX ADMIN — Medication 1 PACKET(S): at 17:32

## 2022-11-14 RX ADMIN — Medication 40 MILLIEQUIVALENT(S): at 09:17

## 2022-11-14 NOTE — PROGRESS NOTE ADULT - SUBJECTIVE AND OBJECTIVE BOX
POD 4 s/p MV repair    Subjective: no complaints, asking to go home denies CP, palpitations, SOB, cough, fever, chills, itchiness/rash, diaphoresis, vision changes, HA, dizziness/lightheadedness, numbness/tingling, abd pain, N/V     T(C): 36.7 (11-13-22 @ 21:00), Max: 36.9 (11-13-22 @ 17:09)  HR: 74 (11-13-22 @ 21:00) (66 - 75)  BP: 120/67 (11-13-22 @ 21:00) (110/69 - 135/80)  RR: 18 (11-13-22 @ 21:00) (17 - 18)  SpO2: 100% (11-13-22 @ 21:00) (96% - 100%)    11-13    141  |  103  |  16.7  ----------------------------<  99  4.0   |  27.0  |  0.73    Ca    8.6      13 Nov 2022 06:14  Mg     1.9     11-13                                 11.1   11.35 )-----------( 148      ( 13 Nov 2022 06:14 )             32.1                    CAPILLARY BLOOD GLUCOSE  POCT Blood Glucose.: 96 mg/dL (13 Nov 2022 21:20)  POCT Blood Glucose.: 103 mg/dL (13 Nov 2022 17:08)  POCT Blood Glucose.: 110 mg/dL (13 Nov 2022 12:12)  POCT Blood Glucose.: 91 mg/dL (13 Nov 2022 08:04)    I&O's Detail    12 Nov 2022 07:01  -  13 Nov 2022 07:00  --------------------------------------------------------  IN:    Oral Fluid: 580 mL  Total IN: 580 mL    OUT:    Voided (mL): 800 mL  Total OUT: 800 mL  Total NET: -220 mL    13 Nov 2022 07:01  -  14 Nov 2022 01:55  --------------------------------------------------------  IN:    Oral Fluid: 1150 mL  Total IN: 1150 mL    OUT:    Voided (mL): 300 mL  Total OUT: 300 mL  Total NET: 850 mL    Drug Dosing Weight  Height (cm): 193 (12 Nov 2022 00:23)  Weight (kg): 81.6 (12 Nov 2022 00:23)  BMI (kg/m2): 21.9 (12 Nov 2022 00:23)  BSA (m2): 2.12 (12 Nov 2022 00:23)    MEDICATIONS  (STANDING):  ascorbic acid 500 milliGRAM(s) Oral daily  aspirin enteric coated 325 milliGRAM(s) Oral daily  atorvastatin 80 milliGRAM(s) Oral at bedtime  dextrose 10% Bolus 250 milliLiter(s) IV Bolus once  enoxaparin Injectable 40 milliGRAM(s) SubCutaneous every 24 hours  ferrous    sulfate 325 milliGRAM(s) Oral daily  folic acid 1 milliGRAM(s) Oral daily  influenza   Vaccine 0.5 milliLiter(s) IntraMuscular once  insulin lispro (ADMELOG) corrective regimen sliding scale   SubCutaneous Before meals and at bedtime  metoprolol tartrate 12.5 milliGRAM(s) Oral two times a day  multivitamin 1 Tablet(s) Oral daily  pantoprazole    Tablet 40 milliGRAM(s) Oral daily  polyethylene glycol 3350 17 Gram(s) Oral daily  psyllium Powder 1 Packet(s) Oral two times a day  senna 2 Tablet(s) Oral at bedtime  sodium chloride 0.9% lock flush 3 milliLiter(s) IV Push every 8 hours    MEDICATIONS  (PRN):  acetaminophen     Tablet .. 650 milliGRAM(s) Oral every 6 hours PRN Temp greater or equal to 38C (100.4F), Mild Pain (1 - 3)  ondansetron Injectable 4 milliGRAM(s) IV Push every 6 hours PRN Nausea and/or Vomiting  oxyCODONE    IR 5 milliGRAM(s) Oral every 4 hours PRN Moderate Pain (4 - 6)  oxyCODONE    IR 10 milliGRAM(s) Oral every 4 hours PRN Severe Pain (7 - 10)    Physical Exam:  Gen: NAD  Neuro: A&Ox3 non focal speech clear and intact  Pulm: decreased R base otherwise clear through out  CV: S1S2 RRR no murmurs  Abd: +BS soft NT ND  Extrem/MS: mild LE edema, no cyanosis, ZHANG  Incision(s): mid sternal dsg intact, sternum stable no click,      POD 4 s/p MV repair    Subjective: no complaints, asking to go home denies CP, palpitations, SOB, cough, fever, chills, itchiness/rash, diaphoresis, vision changes, HA, dizziness/lightheadedness, numbness/tingling, abd pain, N/V     T(C): 36.7 (11-13-22 @ 21:00), Max: 36.9 (11-13-22 @ 17:09)  HR: 74 (11-13-22 @ 21:00) (66 - 75)  BP: 120/67 (11-13-22 @ 21:00) (110/69 - 135/80)  RR: 18 (11-13-22 @ 21:00) (17 - 18)  SpO2: 100% (11-13-22 @ 21:00) (96% - 100%)    11-13    141  |  103  |  16.7  ----------------------------<  99  4.0   |  27.0  |  0.73    Ca    8.6      13 Nov 2022 06:14  Mg     1.9     11-13                                 11.1   11.35 )-----------( 148      ( 13 Nov 2022 06:14 )             32.1                    CAPILLARY BLOOD GLUCOSE  POCT Blood Glucose.: 96 mg/dL (13 Nov 2022 21:20)  POCT Blood Glucose.: 103 mg/dL (13 Nov 2022 17:08)  POCT Blood Glucose.: 110 mg/dL (13 Nov 2022 12:12)  POCT Blood Glucose.: 91 mg/dL (13 Nov 2022 08:04)    I&O's Detail    12 Nov 2022 07:01  -  13 Nov 2022 07:00  --------------------------------------------------------  IN:    Oral Fluid: 580 mL  Total IN: 580 mL    OUT:    Voided (mL): 800 mL  Total OUT: 800 mL  Total NET: -220 mL    13 Nov 2022 07:01  -  14 Nov 2022 01:55  --------------------------------------------------------  IN:    Oral Fluid: 1150 mL  Total IN: 1150 mL    OUT:    Voided (mL): 300 mL  Total OUT: 300 mL  Total NET: 850 mL    Drug Dosing Weight  Height (cm): 193 (12 Nov 2022 00:23)  Weight (kg): 81.6 (12 Nov 2022 00:23)  BMI (kg/m2): 21.9 (12 Nov 2022 00:23)  BSA (m2): 2.12 (12 Nov 2022 00:23)    MEDICATIONS  (STANDING):  ascorbic acid 500 milliGRAM(s) Oral daily  aspirin enteric coated 325 milliGRAM(s) Oral daily  atorvastatin 80 milliGRAM(s) Oral at bedtime  dextrose 10% Bolus 250 milliLiter(s) IV Bolus once  enoxaparin Injectable 40 milliGRAM(s) SubCutaneous every 24 hours  ferrous    sulfate 325 milliGRAM(s) Oral daily  folic acid 1 milliGRAM(s) Oral daily  influenza   Vaccine 0.5 milliLiter(s) IntraMuscular once  insulin lispro (ADMELOG) corrective regimen sliding scale   SubCutaneous Before meals and at bedtime  metoprolol tartrate 12.5 milliGRAM(s) Oral two times a day  multivitamin 1 Tablet(s) Oral daily  pantoprazole    Tablet 40 milliGRAM(s) Oral daily  polyethylene glycol 3350 17 Gram(s) Oral daily  psyllium Powder 1 Packet(s) Oral two times a day  senna 2 Tablet(s) Oral at bedtime  sodium chloride 0.9% lock flush 3 milliLiter(s) IV Push every 8 hours    MEDICATIONS  (PRN):  acetaminophen     Tablet .. 650 milliGRAM(s) Oral every 6 hours PRN Temp greater or equal to 38C (100.4F), Mild Pain (1 - 3)  ondansetron Injectable 4 milliGRAM(s) IV Push every 6 hours PRN Nausea and/or Vomiting  oxyCODONE    IR 5 milliGRAM(s) Oral every 4 hours PRN Moderate Pain (4 - 6)  oxyCODONE    IR 10 milliGRAM(s) Oral every 4 hours PRN Severe Pain (7 - 10)    Physical Exam:  Gen: NAD  Neuro: A&Ox3 non focal speech clear and intact  Pulm: decreased R base otherwise clear through out  CV: S1S2 RRR no murmurs  Abd: +BS soft NT ND  Extrem/MS: mild LE edema, no cyanosis, ZHANG  Incision(s): mid sternal dsg intact, sternum stable no click,   EPM VVI 40/5/1.0

## 2022-11-14 NOTE — CDI QUERY NOTE - NSCDIOTHERTXTBX_GEN_ALL_CORE_HH
•? POST-OP DIAGNOSIS:  Mitral insufficiency 10-Nov-2022 11:24:57  Pedrito Tobin  •? PROCEDURES:  Repair, mitral valve, with LOULOU 10-Nov-2022    11/10 12:00- 94/68, 12:30- 88/58, 13:00- 92/62    111/10 Crit Care- ====================CARDIOVASCULAR==================  Post op Hypovolemia  norepinephrine Infusion 0.05 MICROgram(s)/kG/Min (7.65 mL/Hr) IV Continuous <Continuous>    11/11 CT Surg- s.p Mitral valve repair - w/ triangular resection with annuloplaty and chord x- came out on levophed, brief post op AFib - now on amio;     Is there a more specific diagnosis associated with the hypovolemia and treatment provided?    - Post op hypovolemic shock  - Other  - Not clinically significant

## 2022-11-15 ENCOUNTER — TRANSCRIPTION ENCOUNTER (OUTPATIENT)
Age: 57
End: 2022-11-15

## 2022-11-15 VITALS — SYSTOLIC BLOOD PRESSURE: 125 MMHG | DIASTOLIC BLOOD PRESSURE: 73 MMHG

## 2022-11-15 LAB
ANION GAP SERPL CALC-SCNC: 12 MMOL/L — SIGNIFICANT CHANGE UP (ref 5–17)
BUN SERPL-MCNC: 13.3 MG/DL — SIGNIFICANT CHANGE UP (ref 8–20)
CALCIUM SERPL-MCNC: 8.2 MG/DL — LOW (ref 8.4–10.5)
CHLORIDE SERPL-SCNC: 106 MMOL/L — SIGNIFICANT CHANGE UP (ref 96–108)
CO2 SERPL-SCNC: 23 MMOL/L — SIGNIFICANT CHANGE UP (ref 22–29)
CREAT SERPL-MCNC: 0.71 MG/DL — SIGNIFICANT CHANGE UP (ref 0.5–1.3)
EGFR: 107 ML/MIN/1.73M2 — SIGNIFICANT CHANGE UP
GLUCOSE SERPL-MCNC: 89 MG/DL — SIGNIFICANT CHANGE UP (ref 70–99)
HCT VFR BLD CALC: 33.6 % — LOW (ref 39–50)
HGB BLD-MCNC: 11.5 G/DL — LOW (ref 13–17)
MAGNESIUM SERPL-MCNC: 1.9 MG/DL — SIGNIFICANT CHANGE UP (ref 1.6–2.6)
MCHC RBC-ENTMCNC: 30.9 PG — SIGNIFICANT CHANGE UP (ref 27–34)
MCHC RBC-ENTMCNC: 34.2 GM/DL — SIGNIFICANT CHANGE UP (ref 32–36)
MCV RBC AUTO: 90.3 FL — SIGNIFICANT CHANGE UP (ref 80–100)
PLATELET # BLD AUTO: 220 K/UL — SIGNIFICANT CHANGE UP (ref 150–400)
POTASSIUM SERPL-MCNC: 4 MMOL/L — SIGNIFICANT CHANGE UP (ref 3.5–5.3)
POTASSIUM SERPL-SCNC: 4 MMOL/L — SIGNIFICANT CHANGE UP (ref 3.5–5.3)
RBC # BLD: 3.72 M/UL — LOW (ref 4.2–5.8)
RBC # FLD: 12.3 % — SIGNIFICANT CHANGE UP (ref 10.3–14.5)
SODIUM SERPL-SCNC: 141 MMOL/L — SIGNIFICANT CHANGE UP (ref 135–145)
WBC # BLD: 8.61 K/UL — SIGNIFICANT CHANGE UP (ref 3.8–10.5)
WBC # FLD AUTO: 8.61 K/UL — SIGNIFICANT CHANGE UP (ref 3.8–10.5)

## 2022-11-15 PROCEDURE — 84295 ASSAY OF SERUM SODIUM: CPT

## 2022-11-15 PROCEDURE — 93005 ELECTROCARDIOGRAM TRACING: CPT

## 2022-11-15 PROCEDURE — 80053 COMPREHEN METABOLIC PANEL: CPT

## 2022-11-15 PROCEDURE — 84484 ASSAY OF TROPONIN QUANT: CPT

## 2022-11-15 PROCEDURE — C1769: CPT

## 2022-11-15 PROCEDURE — C1751: CPT

## 2022-11-15 PROCEDURE — 82553 CREATINE MB FRACTION: CPT

## 2022-11-15 PROCEDURE — 94002 VENT MGMT INPAT INIT DAY: CPT

## 2022-11-15 PROCEDURE — 85730 THROMBOPLASTIN TIME PARTIAL: CPT

## 2022-11-15 PROCEDURE — 36415 COLL VENOUS BLD VENIPUNCTURE: CPT

## 2022-11-15 PROCEDURE — 83605 ASSAY OF LACTIC ACID: CPT

## 2022-11-15 PROCEDURE — 85027 COMPLETE CBC AUTOMATED: CPT

## 2022-11-15 PROCEDURE — 83735 ASSAY OF MAGNESIUM: CPT

## 2022-11-15 PROCEDURE — P9045: CPT

## 2022-11-15 PROCEDURE — 71045 X-RAY EXAM CHEST 1 VIEW: CPT | Mod: 26

## 2022-11-15 PROCEDURE — 85018 HEMOGLOBIN: CPT

## 2022-11-15 PROCEDURE — 93320 DOPPLER ECHO COMPLETE: CPT

## 2022-11-15 PROCEDURE — 84132 ASSAY OF SERUM POTASSIUM: CPT

## 2022-11-15 PROCEDURE — 82947 ASSAY GLUCOSE BLOOD QUANT: CPT

## 2022-11-15 PROCEDURE — 93312 ECHO TRANSESOPHAGEAL: CPT

## 2022-11-15 PROCEDURE — 84100 ASSAY OF PHOSPHORUS: CPT

## 2022-11-15 PROCEDURE — 82962 GLUCOSE BLOOD TEST: CPT

## 2022-11-15 PROCEDURE — 80048 BASIC METABOLIC PNL TOTAL CA: CPT

## 2022-11-15 PROCEDURE — 85610 PROTHROMBIN TIME: CPT

## 2022-11-15 PROCEDURE — 85014 HEMATOCRIT: CPT

## 2022-11-15 PROCEDURE — 93325 DOPPLER ECHO COLOR FLOW MAPG: CPT

## 2022-11-15 PROCEDURE — 82330 ASSAY OF CALCIUM: CPT

## 2022-11-15 PROCEDURE — 82550 ASSAY OF CK (CPK): CPT

## 2022-11-15 PROCEDURE — C1889: CPT

## 2022-11-15 PROCEDURE — 82435 ASSAY OF BLOOD CHLORIDE: CPT

## 2022-11-15 PROCEDURE — 85025 COMPLETE CBC W/AUTO DIFF WBC: CPT

## 2022-11-15 PROCEDURE — 71045 X-RAY EXAM CHEST 1 VIEW: CPT

## 2022-11-15 PROCEDURE — 97116 GAIT TRAINING THERAPY: CPT

## 2022-11-15 PROCEDURE — 82803 BLOOD GASES ANY COMBINATION: CPT

## 2022-11-15 PROCEDURE — 88307 TISSUE EXAM BY PATHOLOGIST: CPT

## 2022-11-15 RX ORDER — OXYCODONE HYDROCHLORIDE 5 MG/1
1 TABLET ORAL
Qty: 20 | Refills: 0
Start: 2022-11-15 | End: 2022-11-19

## 2022-11-15 RX ORDER — ASPIRIN/CALCIUM CARB/MAGNESIUM 324 MG
1 TABLET ORAL
Qty: 30 | Refills: 1
Start: 2022-11-15 | End: 2023-01-13

## 2022-11-15 RX ORDER — FOLIC ACID 0.8 MG
1 TABLET ORAL
Qty: 30 | Refills: 0
Start: 2022-11-15 | End: 2022-12-14

## 2022-11-15 RX ORDER — FERROUS SULFATE 325(65) MG
1 TABLET ORAL
Qty: 30 | Refills: 0
Start: 2022-11-15 | End: 2022-12-14

## 2022-11-15 RX ORDER — ACETAMINOPHEN 500 MG
2 TABLET ORAL
Qty: 0 | Refills: 0 | DISCHARGE
Start: 2022-11-15

## 2022-11-15 RX ORDER — MAGNESIUM OXIDE 400 MG ORAL TABLET 241.3 MG
400 TABLET ORAL ONCE
Refills: 0 | Status: COMPLETED | OUTPATIENT
Start: 2022-11-15 | End: 2022-11-15

## 2022-11-15 RX ORDER — METOPROLOL TARTRATE 50 MG
1 TABLET ORAL
Qty: 0 | Refills: 0 | DISCHARGE

## 2022-11-15 RX ORDER — ASCORBIC ACID 60 MG
1 TABLET,CHEWABLE ORAL
Qty: 30 | Refills: 0
Start: 2022-11-15 | End: 2022-12-14

## 2022-11-15 RX ORDER — ASPIRIN/CALCIUM CARB/MAGNESIUM 324 MG
1 TABLET ORAL
Qty: 0 | Refills: 0 | DISCHARGE

## 2022-11-15 RX ORDER — SENNA PLUS 8.6 MG/1
2 TABLET ORAL
Qty: 14 | Refills: 0
Start: 2022-11-15 | End: 2022-11-21

## 2022-11-15 RX ORDER — METOPROLOL TARTRATE 50 MG
0.5 TABLET ORAL
Qty: 30 | Refills: 1
Start: 2022-11-15 | End: 2023-01-13

## 2022-11-15 RX ORDER — IBUPROFEN 200 MG
0 TABLET ORAL
Qty: 0 | Refills: 0 | DISCHARGE

## 2022-11-15 RX ORDER — LISINOPRIL 2.5 MG/1
1 TABLET ORAL
Qty: 0 | Refills: 0 | DISCHARGE

## 2022-11-15 RX ADMIN — Medication 500 MILLIGRAM(S): at 10:23

## 2022-11-15 RX ADMIN — Medication 1 TABLET(S): at 10:22

## 2022-11-15 RX ADMIN — Medication 12.5 MILLIGRAM(S): at 05:31

## 2022-11-15 RX ADMIN — Medication 1 MILLIGRAM(S): at 10:22

## 2022-11-15 RX ADMIN — MAGNESIUM OXIDE 400 MG ORAL TABLET 400 MILLIGRAM(S): 241.3 TABLET ORAL at 10:25

## 2022-11-15 RX ADMIN — Medication 1 PACKET(S): at 05:29

## 2022-11-15 RX ADMIN — Medication 325 MILLIGRAM(S): at 10:23

## 2022-11-15 RX ADMIN — PANTOPRAZOLE SODIUM 40 MILLIGRAM(S): 20 TABLET, DELAYED RELEASE ORAL at 10:22

## 2022-11-15 RX ADMIN — SODIUM CHLORIDE 3 MILLILITER(S): 9 INJECTION INTRAMUSCULAR; INTRAVENOUS; SUBCUTANEOUS at 05:28

## 2022-11-15 RX ADMIN — Medication 325 MILLIGRAM(S): at 10:25

## 2022-11-15 NOTE — DISCHARGE NOTE PROVIDER - NSDCMRMEDTOKEN_GEN_ALL_CORE_FT
Aspir 81 oral delayed release tablet: 1 tab(s) orally once a day  ibuprofen:   lisinopril 10 mg oral tablet: 1 tab(s) orally once a day  Metoprolol Tartrate 25 mg oral tablet: 1 tab(s) orally 2 times a day  mupirocin 2% topical ointment: Apply topically to both nostrils 2 times daily for 5 days. Start on 11/5/2022 to 11/9/22. Do not apply the morning of COVID Test   Vitamin D3 50 mcg (2000 intl units) oral tablet: 1 tab(s) orally once a day   acetaminophen 325 mg oral tablet: 2 tab(s) orally every 6 hours, As needed, Temp greater or equal to 38C (100.4F), Mild Pain (1 - 3)  ascorbic acid 500 mg oral tablet: 1 tab(s) orally once a day  aspirin 325 mg oral delayed release tablet: 1 tab(s) orally once a day  ferrous sulfate 325 mg (65 mg elemental iron) oral tablet: 1 tab(s) orally once a day  folic acid 1 mg oral tablet: 1 tab(s) orally once a day  Metoprolol Tartrate 25 mg oral tablet: 0.5 tab(s) orally 2 times a day   Multiple Vitamins oral tablet: 1 tab(s) orally once a day  oxyCODONE 5 mg oral tablet: 1 tab(s) orally every 6 hours, As Needed -Moderate Pain (4 - 6) MDD:4  senna leaf extract oral tablet: 2 tab(s) orally once a day (at bedtime) please take while taking narcotic pain medication   Vitamin D3 50 mcg (2000 intl units) oral tablet: 1 tab(s) orally once a day

## 2022-11-15 NOTE — DISCHARGE NOTE NURSING/CASE MANAGEMENT/SOCIAL WORK - PATIENT PORTAL LINK FT
You can access the FollowMyHealth Patient Portal offered by [facility] by registering at the following website: [email or web address]. By joining [email or web address] FollowMyHealth portal, you will also be able to view your health information using other applications (apps) compatible with our system.

## 2022-11-15 NOTE — DISCHARGE NOTE PROVIDER - NSDCFUADDINST_GEN_ALL_CORE_FT
Please call the Cardiothoracic Surgery office at [phone] if you are experiencing any shortness of breath, chest pain, fevers or chills, drainage from the incisions, persistent nausea, vomiting or if you have any questions about your medications. If the symptoms are severe, call 911 and go to the nearest hospital. You can also call [phone] for an emergency [facility] ambulance, which will take you to the closest [facility].    If you need any assistance for making any appointments for a new consult or referral in any specialty, please call our [facility] Clinical Coordination Center at [phone].

## 2022-11-15 NOTE — DISCHARGE NOTE PROVIDER - CARE PROVIDERS DIRECT ADDRESSES
[email or web address]

## 2022-11-15 NOTE — PROGRESS NOTE ADULT - PROBLEM SELECTOR PLAN 1
S/p mitral repair with Dr Pollock 11/10  oob, ambulate, PT as tolerated  spo2 stable on RA, f/u AM CXR, encourage chest PT, Incentive Spirometry, deep breathing/coughing  Hemodynamically stable, cont lopressor, asa  amio d/c'd 11/12 due to junctional rhythm remains SR  consider d/c lipitor, was not on at home, preop lipid panel WNL  tolerating diet, cont bowel regimen  + PW, possible d/c today if no further junctional rhythm  likely d/c chest dressing and shower later today  dispo: home tues  plan to be d/w CT surgery attending in AM rounds
s/p mitral repair  lopressor in AM   continue with asa   pain control PRN   protonix for gi proph   senna and miralax for bowel regimen   scd boot - lovenox thereafter   pt to see in AM   chest pt/ is couhing and deep breathing
S/p mitral repair with Dr Pollock 11/10  Neurologically intact  Hemodynamically stable  *AMIO HELD 2/2 converting to slow junctional rhythm 11/12  -Reassess in AM when to restart.   Cont lopressor  Cont Aspirin, Lipitor  Oxygenating well, coughing and deep breathing exercises/incentive spirometry instructed and encouraged  Monitor I/O, UO. Replete electrolytes PRN  OOB to chair and PT consult this morning  PRN for analgesia mgmt  Continue with supportive ICU care as needed  Above plan of care to be discussed with CT surgical team on AM rounds
S/p mitral repair with Dr Pollock 11/10  oob, ambulate, PT as tolerated  spo2 stable on RA, f/u AM CXR, encourage chest PT, Incentive Spirometry, deep breathing/coughing  Hemodynamically stable, cont lopressor, asa  amio d/c'd 11/12 due to junctional rhythm remains SR  tolerating diet, cont bowel regimen  cut PW on discharge, remove staples  home today   plan to be d/w CT surgery attending in AM rounds
s/p mitral repair  Continue lopressor  continue with asa   pain control PRN   protonix for gi proph   senna and miralax for bowel regimen   chest pt/ is coughing and deep breathing

## 2022-11-15 NOTE — PROGRESS NOTE ADULT - ASSESSMENT
57 M with history of mitral regurgitation & hypertension presented through [facility]- now s.p Mitral valve repair - w/ triangular resection with annuloplaty and chord x- came out on levophed, brief post op AFib - now on amio.  11/12: Transferred to Floor
57 M with history of mitral regurgitation & hypertension presented through [facility]- now s.p Mitral valve repair - w/ triangular resection with annuloplaty and chord x- came out on levophed, brief post op AFib - now on amio; 
57M pmhx of mitral regurgitation and HTN now s/p MV repair with triangular resection with annuloplasty and chord x, postop course notable for brief AF, started on amio load which was subsequently d/c'd 11/12 for JR. currently SR;  
57M pmhx of mitral regurgitation and HTN now s/p MV repair with triangular resection with annuloplasty and chord x, postop course notable for brief AF, started on amio load which was subsequently d/c'd 11/12 for JR. currently SR;  
57 M with history of mitral regurgitation & hypertension presented through [facility]- now s.p Mitral valve repair - w/ triangular resection with annuloplaty and chord x- came out on levophed, brief post op AFib - now on amio;

## 2022-11-15 NOTE — PROGRESS NOTE ADULT - SUBJECTIVE AND OBJECTIVE BOX
POD 5 s/p MV repair    Subjective: no complaints denies CP, palpitations, SOB, cough, fever, chills, itchiness/rash, diaphoresis, vision changes, HA, dizziness/lightheadedness, numbness/tingling, abd pain, N/V     T(C): 37 (11-15-22 @ 00:30), Max: 37.3 (11-14-22 @ 20:20)  HR: 83 (11-15-22 @ 00:30) (78 - 88)  BP: 131/85 (11-15-22 @ 00:30) (114/77 - 144/83)  RR: 17 (11-15-22 @ 00:30) (17 - 18)  SpO2: 99% (11-15-22 @ 00:30) (97% - 99%)    11-14    143  |  107  |  14.5  ----------------------------<  87  3.7   |  24.0  |  0.73    Ca    8.4      14 Nov 2022 04:50  Mg     1.8     11-14                                 11.2   8.14  )-----------( 179      ( 14 Nov 2022 04:50 )             33.1        PT/INR - ( 14 Nov 2022 04:50 )   PT: 13.1 sec;   INR: 1.13 ratio                CAPILLARY BLOOD GLUCOSE  POCT Blood Glucose.: 145 mg/dL (14 Nov 2022 12:06)  POCT Blood Glucose.: 100 mg/dL (14 Nov 2022 08:03)    I&O's Detail    13 Nov 2022 07:01  -  14 Nov 2022 07:00  --------------------------------------------------------  IN:    Oral Fluid: 1200 mL  Total IN: 1200 mL    OUT:    Voided (mL): 600 mL  Total OUT: 600 mL  Total NET: 600 mL    14 Nov 2022 07:01  -  15 Nov 2022 02:52  --------------------------------------------------------  IN:    Oral Fluid: 760 mL  Total IN: 760 mL    OUT:    Voided (mL): 300 mL  Total OUT: 300 mL  Total NET: 460 mL    Drug Dosing Weight  Height (cm): 193 (12 Nov 2022 00:23)  Weight (kg): 81.6 (12 Nov 2022 00:23)  BMI (kg/m2): 21.9 (12 Nov 2022 00:23)  BSA (m2): 2.12 (12 Nov 2022 00:23)    MEDICATIONS  (STANDING):  ascorbic acid 500 milliGRAM(s) Oral daily  aspirin enteric coated 325 milliGRAM(s) Oral daily  ferrous    sulfate 325 milliGRAM(s) Oral daily  folic acid 1 milliGRAM(s) Oral daily  influenza   Vaccine 0.5 milliLiter(s) IntraMuscular once  metoprolol tartrate 12.5 milliGRAM(s) Oral two times a day  multivitamin 1 Tablet(s) Oral daily  pantoprazole    Tablet 40 milliGRAM(s) Oral daily  polyethylene glycol 3350 17 Gram(s) Oral daily  psyllium Powder 1 Packet(s) Oral two times a day  senna 2 Tablet(s) Oral at bedtime  sodium chloride 0.9% lock flush 3 milliLiter(s) IV Push every 8 hours    MEDICATIONS  (PRN):  acetaminophen     Tablet .. 650 milliGRAM(s) Oral every 6 hours PRN Temp greater or equal to 38C (100.4F), Mild Pain (1 - 3)  ondansetron Injectable 4 milliGRAM(s) IV Push every 6 hours PRN Nausea and/or Vomiting  oxyCODONE    IR 5 milliGRAM(s) Oral every 4 hours PRN Moderate Pain (4 - 6)  oxyCODONE    IR 10 milliGRAM(s) Oral every 4 hours PRN Severe Pain (7 - 10)    Physical Exam:  Gen: NAD  Neuro: A&Ox3 non focal speech clear and intact  Pulm: CTA b/l no wheezing  CV: S1S2 RRR no murmurs  Abd: +BS soft NT ND  Extrem/MS: mild LE edema, no cyanosis, ZHANG  Incision(s): mid sternal inc intacdt + staples, sternum stable no click,   + V wires

## 2022-11-15 NOTE — DISCHARGE NOTE PROVIDER - NSDCCPCAREPLAN_GEN_ALL_CORE_FT
PRINCIPAL DISCHARGE DIAGNOSIS  Diagnosis: Nonrheumatic mitral (valve) insufficiency  Assessment and Plan of Treatment: please refer to discharge instructions in your folder  shower daily with soap and water to the incision, no bathing/hot tubs/pools/swimming  no lotions/creams to incisions  take all your medications as prescribed  keep your follow up appointments

## 2022-11-15 NOTE — DISCHARGE NOTE NURSING/CASE MANAGEMENT/SOCIAL WORK - NSDCFUADDAPPT_GEN_ALL_CORE_FT
1. Follow up with Dr. Pollock on Tuesday November 22, 2022 at 9:30AM   The cardiac surgery office is located at [facility], first floor. Take a left at the end of the lobby until the end of that [place] (past the elevator bank). Make a left and the office is on your right across from the elevators.   2. Follow up with cardiology in 2 weeks.    Your Care Navigator Nurse Practitioner will be in touch to see you in your home within a few days from discharge. The Follow Your Heart program can help ensure you understand your medications, discharge instructions and answer any questions you may have at that time. They are also a great source to address concerns during the day and may be reached at [phone].

## 2022-11-15 NOTE — PROGRESS NOTE ADULT - PROBLEM SELECTOR PLAN 3
New onset afib in postop setting.  Continue BB  Continue PO amio load.   Currently NSR.
New onset afib in postop setting.  Continue BB  PO AMIO load on HOLD 2/2 recent conversation to slow junctional on 11/12  Currently NSR.
breif episode postop started on Amio--> subsequently d/c'd 11/12 for junctional rhyhtm  currently remains SR on lopressor  EPM VVI 40, likely d/c wires today
breif episode postop started on Amio--> subsequently d/c'd 11/12 for junctional rhythm  currently remains SR on lopressor  cut PW today on discharge

## 2022-11-15 NOTE — PROGRESS NOTE ADULT - PROBLEM SELECTOR PLAN 4
cont protonix for GI ppx  cont lovenox for dvt ppx
cont protonix for GI ppx  cont lovenox for dvt ppx
Cont GI and DVT prophylaxis
Lovenox and SCDs for DVT prophylaxis  Protonix for GI prophylaxis  continue with bowel regimen

## 2022-11-15 NOTE — DISCHARGE NOTE PROVIDER - HOSPITAL COURSE
57M pmhx of mitral regurgitation and HTN now s/p MV repair with triangular resection with annuloplasty and chord x, postop course notable for brief AF, started on amio load which was subsequently d/c'd 11/12 for JR. Pt remains SR, hemodynamically stable, ambulating well. pt stable for discharge home per Dr. Pollock.  PW cut at skin on day of discharge.   57M pmhx of mitral regurgitation and HTN now s/p MV repair with triangular resection with annuloplasty and chord x, postop course notable for brief AF, started on amio load which was subsequently d/c'd 11/12 for JR. Pt remains SR, hemodynamically stable, ambulating well. pt stable for discharge home per Dr. Pollock. PW cut at skin on day of discharge.

## 2022-11-15 NOTE — DISCHARGE NOTE PROVIDER - NSDCFUADDAPPT_GEN_ALL_CORE_FT
1. Follow up with Dr. Pollock on Tuesday _____  The cardiac surgery office is located at [facility], first floor. Take a left at the end of the lobby until the end of that [place] (past the elevator bank). Make a left and the office is on your right across from the elevators.   2. Follow up with cardiology in 2 weeks.    Your Care Navigator Nurse Practitioner will be in touch to see you in your home within a few days from discharge. The Follow Your Heart program can help ensure you understand your medications, discharge instructions and answer any questions you may have at that time. They are also a great source to address concerns during the day and may be reached at [phone].  1. Follow up with Dr. Pollock on Tuesday November 22, 2022 at 9:30AM   The cardiac surgery office is located at [facility], first floor. Take a left at the end of the lobby until the end of that [place] (past the elevator bank). Make a left and the office is on your right across from the elevators.   2. Follow up with cardiology in 2 weeks.    Your Care Navigator Nurse Practitioner will be in touch to see you in your home within a few days from discharge. The Follow Your Heart program can help ensure you understand your medications, discharge instructions and answer any questions you may have at that time. They are also a great source to address concerns during the day and may be reached at [phone].

## 2022-11-15 NOTE — DISCHARGE NOTE PROVIDER - PROVIDER TOKENS
PROVIDER:[TOKEN:[[id number]]],PROVIDER:[TOKEN:[[id number]]] PROVIDER:[TOKEN:[[id number]],SCHEDULEDAPPT:[11/22/2022],SCHEDULEDAPPTTIME:[09:30 AM]],PROVIDER:[TOKEN:[[id number]],FOLLOWUP:[2 weeks]]

## 2022-11-15 NOTE — DISCHARGE NOTE PROVIDER - CARE PROVIDER_API CALL
Babar Pollock)  Surgery; Thoracic and Cardiac Surgery  [address] [place] [address]  Phone: [phone]  Fax: [phone]  Follow Up Time:     Jesus Chaudhari)  Cardiovascular Disease; Internal Medicine; Interventional Cardiology  [address] [place] [address]  Phone: [phone]  Fax: [phone]  Follow Up Time:    Babar Pollock)  Surgery; Thoracic and Cardiac Surgery  [address] [place] [address]  Phone: [phone]  Fax: [phone]  Scheduled Appointment: 11/22/2022 09:30 AM    Jesus Chaudhari)  Cardiovascular Disease; Internal Medicine; Interventional Cardiology  [address] [place] [address]  Phone: [phone]  Fax: [phone]  Follow Up Time: 2 weeks

## 2022-11-16 ENCOUNTER — APPOINTMENT (OUTPATIENT)
Dept: CARE COORDINATION | Facility: HOME HEALTH | Age: 57
End: 2022-11-16

## 2022-11-16 VITALS — HEIGHT: 74.5 IN | BODY MASS INDEX: 24.13 KG/M2 | WEIGHT: 190 LBS

## 2022-11-16 PROCEDURE — 99024 POSTOP FOLLOW-UP VISIT: CPT

## 2022-11-16 RX ORDER — MUPIROCIN 20 MG/G
2 OINTMENT TOPICAL
Qty: 22 | Refills: 0 | Status: DISCONTINUED | COMMUNITY
Start: 2022-10-27 | End: 2022-11-16

## 2022-11-16 RX ORDER — COVID-19 MOLECULAR TEST ASSAY
KIT MISCELLANEOUS
Qty: 1 | Refills: 0 | Status: DISCONTINUED | COMMUNITY
Start: 2022-06-07

## 2022-11-16 RX ORDER — ASPIRIN 81 MG/1
81 TABLET ORAL DAILY
Qty: 90 | Refills: 3 | Status: DISCONTINUED | COMMUNITY
Start: 2022-01-18 | End: 2022-11-16

## 2022-11-16 RX ORDER — LISINOPRIL 10 MG/1
10 TABLET ORAL DAILY
Qty: 90 | Refills: 2 | Status: DISCONTINUED | COMMUNITY
Start: 2022-01-18 | End: 2022-11-16

## 2022-11-16 NOTE — PHYSICAL EXAM
[Sclera] : the sclera and conjunctiva were normal [Neck Appearance] : the appearance of the neck was normal [Respiration, Rhythm And Depth] : normal respiratory rhythm and effort [Exaggerated Use Of Accessory Muscles For Inspiration] : no accessory muscle use [Chest Visual Inspection Thoracic Asymmetry] : no chest asymmetry [FreeTextEntry1] : MSI and CT sites without erythema, drainage or warmth, with edges well approximated. Steri strips to distal pole, CD&I, CT sutures remain in place [FreeTextEntry2] : B/L LE without edema, B/L calves soft, NT as per pt [Abnormal Walk] : normal gait [Musculoskeletal - Swelling] : no joint swelling seen [Skin Color & Pigmentation] : normal skin color and pigmentation [] : no rash [Oriented To Time, Place, And Person] : oriented to person, place, and time [Impaired Insight] : insight and judgment were intact [Affect] : the affect was normal

## 2022-11-16 NOTE — ASSESSMENT
[FreeTextEntry1] : Pt recovering well at home s/p OHS. Reviewed all medications and dosages with pt understanding. Pt has all medications in home and is taking as prescribed. Pain controlled with current medication regimen. No new symptoms, issues or concerns to report at this time.\par VN saw pt today, no issues to report. 
no

## 2022-11-16 NOTE — HISTORY OF PRESENT ILLNESS
[Home] : at home, [unfilled] , at the time of the visit. [Other Location: e.g. Home (Enter Location, City,State)___] : at [unfilled] [Spouse] : spouse [Verbal consent obtained from patient] : the patient, [unfilled] [FreeTextEntry1] : 57M pmhx of mitral regurgitation and HTN now s/p MV repair with triangular \par resection with annuloplasty and chord x, postop course notable for brief AF, \par started on amio load which was subsequently d/c'd 11/12 for JR. Pt remains SR, \par hemodynamically stable, ambulating well. Pt remained hemodynamically stable and discharged home with support of spouse/family, home care services and the [facility] team. Initial visit completed via Telehealth\par CC "I''m doing quite well"\par

## 2022-11-16 NOTE — REASON FOR VISIT
[Follow-Up: _____] : a [unfilled] follow-up visit [FreeTextEntry1] : FOLLOW YOUR HEART- Transitional Care Management Program- [facility]\par \par

## 2022-11-17 LAB — SURGICAL PATHOLOGY STUDY: SIGNIFICANT CHANGE UP

## 2022-11-19 ENCOUNTER — TRANSCRIPTION ENCOUNTER (OUTPATIENT)
Age: 57
End: 2022-11-19

## 2022-11-20 ENCOUNTER — TRANSCRIPTION ENCOUNTER (OUTPATIENT)
Age: 57
End: 2022-11-20

## 2022-11-22 ENCOUNTER — APPOINTMENT (OUTPATIENT)
Dept: CARDIOTHORACIC SURGERY | Facility: CLINIC | Age: 57
End: 2022-11-22

## 2022-11-22 VITALS
DIASTOLIC BLOOD PRESSURE: 67 MMHG | OXYGEN SATURATION: 98 % | HEIGHT: 76 IN | TEMPERATURE: 98.1 F | BODY MASS INDEX: 22.41 KG/M2 | WEIGHT: 184 LBS | HEART RATE: 74 BPM | RESPIRATION RATE: 18 BRPM | SYSTOLIC BLOOD PRESSURE: 99 MMHG

## 2022-11-22 PROCEDURE — 99024 POSTOP FOLLOW-UP VISIT: CPT

## 2022-11-22 RX ORDER — OXYCODONE 5 MG/1
5 TABLET ORAL
Qty: 20 | Refills: 0 | Status: COMPLETED | COMMUNITY
Start: 2022-11-15 | End: 2022-11-22

## 2022-11-22 NOTE — PHYSICAL EXAM
[] : no respiratory distress [Respiration, Rhythm And Depth] : normal respiratory rhythm and effort [Auscultation Breath Sounds / Voice Sounds] : lungs were clear to auscultation bilaterally [Heart Rate And Rhythm] : heart rate was normal and rhythm regular [Clean] : clean [Dry] : dry [Healing Well] : healing well [No Edema] : no edema

## 2022-11-23 ENCOUNTER — NON-APPOINTMENT (OUTPATIENT)
Age: 57
End: 2022-11-23

## 2022-11-23 NOTE — REASON FOR VISIT
[Family Member] : family member [de-identified] : Mitral Valve Repair # [id number] Blanchard ring, triangular leaflet resection with cords [de-identified] : 11/10/22

## 2022-11-23 NOTE — DISCUSSION/SUMMARY
[Doing Well] : is doing well [Excellent Pain Control] : has excellent pain control [No Sign of Infection] : is showing no signs of infection [Remove Sutures/Staples] : removed sutures/staples [Coumadin] : the patient is not on Coumadin

## 2022-11-23 NOTE — CONSULT LETTER
[Dear  ___] : Dear  [unfilled], [Courtesy Letter:] : I had the pleasure of seeing your patient, [unfilled], in my office today. [Please see my note below.] : Please see my note below. [Consult Closing:] : Thank you very much for allowing me to participate in the care of this patient.  If you have any questions, please do not hesitate to contact me. [Sincerely,] : Sincerely, [FreeTextEntry2] : Jesus Chaudhari MD [FreeTextEntry3] : Babar Pollock MD\par , Cardiothoracic Surgery\par , Cardiovascular and Thoracic Surgery\par [facility], [facility], [place]\par [facility]\par [facility]\par [address] [place] [address]\[place] [address]\par Tel. [phone]\par Fax [phone]

## 2022-11-23 NOTE — ASSESSMENT
[FreeTextEntry1] : Today the patient reports that he is experiencing mild chest discomfort, mild muscular back pain, and shortness of breath on exertion. He denies cough, fever, chills, unintentional weight loss/gain, and night sweats.  I am overall happy with the patient's progression post operatively. I suggest that he decrease aspirin from 325mg daily to 81mg daily and continue follow up care with cardiology. Patient states that he has an appointment with Dr. Chaudhari next week.\par \par Today on exam patient's lungs clear bilaterally, sternum stable, incision clean, dry and intact. No peripheral edema noted. Instructed patient on importance of optimal glycemic control, daily showering, daily weights, incentive spirometer use, and increase ambulation as tolerated. Instructed to call office with any signs or symptoms of infection or weight gain of 2 or more pounds in 1 day or 3 or more pounds in 1 week. \par \par PLAN:\par - Decrease aspirin from 325mg daily to 81mg daily\par - Continue follow up care with cardiology.\par - Return to care in office as needed.\par \par \par \par \par IJenny NP am scribing for and in the presence of Dr. Pollock the following sections HISTORY OF PRESENT ILLNESS, PAST MEDICAL/FAMILY/SOCIAL HISTORY; REVIEW OF SYSTEMS; VITAL SIGNS; PHYSICAL EXAM; DISPOSITION.\par \par "I personally performed the services described in the documentation, reviewed the documentation recorded by the scribe in my presence and accurately and completely records my words and actions."

## 2022-11-30 ENCOUNTER — APPOINTMENT (OUTPATIENT)
Dept: CARDIOLOGY | Facility: CLINIC | Age: 57
End: 2022-11-30

## 2022-11-30 VITALS
HEIGHT: 76 IN | OXYGEN SATURATION: 97 % | TEMPERATURE: 98.2 F | BODY MASS INDEX: 22.37 KG/M2 | HEART RATE: 115 BPM | WEIGHT: 183.7 LBS | RESPIRATION RATE: 14 BRPM | SYSTOLIC BLOOD PRESSURE: 104 MMHG | DIASTOLIC BLOOD PRESSURE: 70 MMHG

## 2022-11-30 DIAGNOSIS — R00.0 TACHYCARDIA, UNSPECIFIED: ICD-10-CM

## 2022-11-30 PROCEDURE — 93000 ELECTROCARDIOGRAM COMPLETE: CPT

## 2022-11-30 PROCEDURE — 99215 OFFICE O/P EST HI 40 MIN: CPT | Mod: 25

## 2022-11-30 RX ORDER — FOLIC ACID 1 MG/1
1 TABLET ORAL
Refills: 0 | Status: DISCONTINUED | COMMUNITY
End: 2022-11-30

## 2022-11-30 RX ORDER — ERGOCALCIFEROL (VITAMIN D2) 50 MCG
50 MCG CAPSULE ORAL
Refills: 0 | Status: ACTIVE | COMMUNITY
Start: 2022-06-15

## 2022-11-30 RX ORDER — SENNOSIDES 8.6 MG
8.6 TABLET ORAL
Refills: 0 | Status: DISCONTINUED | COMMUNITY
End: 2022-11-30

## 2022-12-12 ENCOUNTER — APPOINTMENT (OUTPATIENT)
Dept: CARDIOLOGY | Facility: CLINIC | Age: 57
End: 2022-12-12

## 2022-12-12 PROCEDURE — 93306 TTE W/DOPPLER COMPLETE: CPT

## 2022-12-15 ENCOUNTER — TRANSCRIPTION ENCOUNTER (OUTPATIENT)
Age: 57
End: 2022-12-15

## 2022-12-20 ENCOUNTER — TRANSCRIPTION ENCOUNTER (OUTPATIENT)
Age: 57
End: 2022-12-20

## 2022-12-21 ENCOUNTER — APPOINTMENT (OUTPATIENT)
Dept: CARDIOLOGY | Facility: CLINIC | Age: 57
End: 2022-12-21

## 2022-12-21 VITALS
WEIGHT: 185 LBS | HEART RATE: 81 BPM | SYSTOLIC BLOOD PRESSURE: 126 MMHG | DIASTOLIC BLOOD PRESSURE: 83 MMHG | TEMPERATURE: 98.1 F | BODY MASS INDEX: 22.53 KG/M2 | HEIGHT: 76 IN | OXYGEN SATURATION: 100 %

## 2022-12-21 DIAGNOSIS — I50.1 LEFT VENTRICULAR FAILURE, UNSPECIFIED: ICD-10-CM

## 2022-12-21 PROCEDURE — 99213 OFFICE O/P EST LOW 20 MIN: CPT

## 2022-12-21 RX ORDER — MULTIVIT-MIN/FOLIC/VIT K/LYCOP 400-300MCG
500 TABLET ORAL
Refills: 0 | Status: DISCONTINUED | COMMUNITY
End: 2022-12-21

## 2022-12-21 RX ORDER — FOLIC ACID 1 MG/1
1 TABLET ORAL
Qty: 30 | Refills: 0 | Status: DISCONTINUED | COMMUNITY
Start: 2022-11-15 | End: 2022-12-21

## 2022-12-21 RX ORDER — FERROUS SULFATE TAB EC 324 MG (65 MG FE EQUIVALENT) 324 (65 FE) MG
324 (65 FE) TABLET DELAYED RESPONSE ORAL
Refills: 0 | Status: DISCONTINUED | COMMUNITY
End: 2022-12-21

## 2022-12-21 NOTE — ASSESSMENT
[FreeTextEntry1] : ECG performed today at the office revealed a NSR 56 bpm, with normal AQRS, WA, QRS and QTc.\par

## 2022-12-21 NOTE — PHYSICAL EXAM

## 2022-12-21 NOTE — DISCUSSION/SUMMARY
[FreeTextEntry1] : Mr. JAVI TELLEZ is a 57 year male with severe mitral regurgitation. Had a TTE and LOULOU that revealed severe eccentric MR with a regurgitant volume of 190 mL an LVEF=58%. Marantic endocarditis? Has not undergone evaluation for possible etiologies. Had  MVR (repair with ring) on 11/10/2022. No complications. Echo following the surgery revealed reduced LV function (LVEF=30-35% with inferior and lateral akinesis. No signs or symptoms of heart failure. NYHA 1/4.\par Will start Entresto 24/26 mg BID and escalate according to response and BP\par Will perform a nuclear stress test in view of the segmental wall motion abnormalities and R/O ischemia\par He has an MCOT for possible arrhythmia.\par Routine follow up in 3 months\par

## 2022-12-21 NOTE — HISTORY OF PRESENT ILLNESS
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 Recent hospitalization at [facility] on 1/6/[place]22 for palpitations, fast, regular, last 1-2 minutes, starts and ends suddenly, not associated with SOB, CP, dizziness or other symptoms. Not associated with coffee or caffeine ingestion. At , he was on obs for 24 hours and had an echo that revealed a "leaky valve". Discharged home on meds.\par Had an TTE that revealed a thickened anterior mitral valve leaflet. Partial flail A2 with severe eccentric MR. The LA is moderately enlarged. Possible marantic endocarditis?\par In 5/24/2022 he had a LOULOU that revealed ruptured chordae with thickened and redundant mitral valve leaflet. Severe MR.  Mild TR. LVEF=57%. \par Cardiac cath in Sept 15, 2022 revealed normal LVEF=75% with severe MR +4. Normal coronary arteries. \par Underwent  MVR (repair with ring) on 11/10/2022 with Dr Pollock. No complications.\par Follow up TTE performed on 12/14/2022 revealed reduced LV function (LVEF=30-35%), with lateral and inferior wall revealed lateral and inferior wall akinesis. \par Today for follow up. At the present time patient is completely asymptomatic and denies CP, SOB, orthopnea or PND. Denies palpitations, dizziness or ankle swelling. Has been walking up to 1 mile with no symptoms. NYHA 1/4. Has an MCOT. \par No surgeries.\par Doesnt smoke\par Occasional alcohol\par Denies the use of illicit drugs\par Received the COVID 19 vaccine\par No family history of heart disease.

## 2022-12-27 ENCOUNTER — APPOINTMENT (OUTPATIENT)
Dept: CARDIOLOGY | Facility: CLINIC | Age: 57
End: 2022-12-27

## 2023-01-12 ENCOUNTER — APPOINTMENT (OUTPATIENT)
Dept: CARDIOLOGY | Facility: CLINIC | Age: 58
End: 2023-01-12
Payer: COMMERCIAL

## 2023-01-12 PROCEDURE — 93015 CV STRESS TEST SUPVJ I&R: CPT

## 2023-01-12 PROCEDURE — 78452 HT MUSCLE IMAGE SPECT MULT: CPT

## 2023-01-12 PROCEDURE — A9500: CPT

## 2023-01-24 ENCOUNTER — RX RENEWAL (OUTPATIENT)
Age: 58
End: 2023-01-24

## 2023-02-09 ENCOUNTER — RX RENEWAL (OUTPATIENT)
Age: 58
End: 2023-02-09

## 2023-02-14 ENCOUNTER — APPOINTMENT (OUTPATIENT)
Dept: CARDIOLOGY | Facility: CLINIC | Age: 58
End: 2023-02-14
Payer: COMMERCIAL

## 2023-02-14 ENCOUNTER — NON-APPOINTMENT (OUTPATIENT)
Age: 58
End: 2023-02-14

## 2023-02-14 VITALS
DIASTOLIC BLOOD PRESSURE: 79 MMHG | SYSTOLIC BLOOD PRESSURE: 137 MMHG | BODY MASS INDEX: 24.36 KG/M2 | WEIGHT: 200 LBS | OXYGEN SATURATION: 97 % | TEMPERATURE: 97.8 F | HEART RATE: 87 BPM | HEIGHT: 76 IN

## 2023-02-14 DIAGNOSIS — R00.2 PALPITATIONS: ICD-10-CM

## 2023-02-14 PROCEDURE — 99215 OFFICE O/P EST HI 40 MIN: CPT

## 2023-02-14 RX ORDER — ASPIRIN ENTERIC COATED TABLETS 81 MG 81 MG/1
81 TABLET, DELAYED RELEASE ORAL
Refills: 0 | Status: DISCONTINUED | COMMUNITY
End: 2023-02-14

## 2023-02-28 LAB
ANION GAP SERPL CALC-SCNC: 11 MMOL/L
BUN SERPL-MCNC: 14 MG/DL
CALCIUM SERPL-MCNC: 9.5 MG/DL
CHLORIDE SERPL-SCNC: 105 MMOL/L
CO2 SERPL-SCNC: 25 MMOL/L
CREAT SERPL-MCNC: 1.02 MG/DL
EGFR: 86 ML/MIN/1.73M2
GLUCOSE SERPL-MCNC: 53 MG/DL
POTASSIUM SERPL-SCNC: 4.5 MMOL/L
SODIUM SERPL-SCNC: 141 MMOL/L

## 2023-03-20 ENCOUNTER — RX RENEWAL (OUTPATIENT)
Age: 58
End: 2023-03-20

## 2023-03-27 ENCOUNTER — APPOINTMENT (OUTPATIENT)
Dept: CARDIOLOGY | Facility: CLINIC | Age: 58
End: 2023-03-27
Payer: COMMERCIAL

## 2023-03-27 PROCEDURE — 93306 TTE W/DOPPLER COMPLETE: CPT

## 2023-03-29 RX ORDER — METOPROLOL TARTRATE 25 MG/1
25 TABLET, FILM COATED ORAL TWICE DAILY
Refills: 0 | Status: DISCONTINUED | COMMUNITY
End: 2023-03-29

## 2023-04-10 LAB
ANION GAP SERPL CALC-SCNC: 14 MMOL/L
BUN SERPL-MCNC: 22 MG/DL
CALCIUM SERPL-MCNC: 9.8 MG/DL
CHLORIDE SERPL-SCNC: 104 MMOL/L
CO2 SERPL-SCNC: 24 MMOL/L
CREAT SERPL-MCNC: 1.2 MG/DL
EGFR: 71 ML/MIN/1.73M2
GLUCOSE SERPL-MCNC: 55 MG/DL
POTASSIUM SERPL-SCNC: 5.2 MMOL/L
SODIUM SERPL-SCNC: 142 MMOL/L

## 2023-04-11 ENCOUNTER — TRANSCRIPTION ENCOUNTER (OUTPATIENT)
Age: 58
End: 2023-04-11

## 2023-05-17 ENCOUNTER — APPOINTMENT (OUTPATIENT)
Dept: CARDIOLOGY | Facility: CLINIC | Age: 58
End: 2023-05-17
Payer: COMMERCIAL

## 2023-05-17 ENCOUNTER — NON-APPOINTMENT (OUTPATIENT)
Age: 58
End: 2023-05-17

## 2023-05-17 VITALS
TEMPERATURE: 98.1 F | OXYGEN SATURATION: 97 % | WEIGHT: 192 LBS | DIASTOLIC BLOOD PRESSURE: 64 MMHG | BODY MASS INDEX: 23.38 KG/M2 | HEIGHT: 76 IN | HEART RATE: 72 BPM | SYSTOLIC BLOOD PRESSURE: 99 MMHG

## 2023-05-17 PROCEDURE — 99215 OFFICE O/P EST HI 40 MIN: CPT | Mod: 25

## 2023-05-17 PROCEDURE — 93000 ELECTROCARDIOGRAM COMPLETE: CPT

## 2023-05-27 ENCOUNTER — LABORATORY RESULT (OUTPATIENT)
Age: 58
End: 2023-05-27

## 2023-06-27 ENCOUNTER — TRANSCRIPTION ENCOUNTER (OUTPATIENT)
Age: 58
End: 2023-06-27

## 2023-06-30 ENCOUNTER — RX RENEWAL (OUTPATIENT)
Age: 58
End: 2023-06-30

## 2023-08-08 ENCOUNTER — APPOINTMENT (OUTPATIENT)
Dept: ELECTROPHYSIOLOGY | Facility: CLINIC | Age: 58
End: 2023-08-08

## 2023-09-12 ENCOUNTER — APPOINTMENT (OUTPATIENT)
Dept: ELECTROPHYSIOLOGY | Facility: CLINIC | Age: 58
End: 2023-09-12

## 2023-09-13 NOTE — DIETITIAN INITIAL EVALUATION ADULT - NSICDXPASTMEDICALHX_GEN_ALL_CORE_FT
PAST MEDICAL HISTORY:  Hypertension     Mitral valve stenosis, moderate     Nonrheumatic mitral valve regurgitation     Palpitations      Hematuria

## 2023-09-18 ENCOUNTER — APPOINTMENT (OUTPATIENT)
Dept: CARDIOLOGY | Facility: CLINIC | Age: 58
End: 2023-09-18

## 2023-09-20 ENCOUNTER — APPOINTMENT (OUTPATIENT)
Dept: CARDIOLOGY | Facility: CLINIC | Age: 58
End: 2023-09-20

## 2023-10-30 ENCOUNTER — RX RENEWAL (OUTPATIENT)
Age: 58
End: 2023-10-30

## 2023-11-24 ENCOUNTER — APPOINTMENT (OUTPATIENT)
Dept: CARDIOLOGY | Facility: CLINIC | Age: 58
End: 2023-11-24
Payer: COMMERCIAL

## 2023-11-24 PROCEDURE — 93306 TTE W/DOPPLER COMPLETE: CPT

## 2023-11-27 ENCOUNTER — NON-APPOINTMENT (OUTPATIENT)
Age: 58
End: 2023-11-27

## 2023-11-27 RX ORDER — DAPAGLIFLOZIN 5 MG/1
5 TABLET, FILM COATED ORAL
Qty: 90 | Refills: 2 | Status: ACTIVE | COMMUNITY
Start: 2023-05-17 | End: 1900-01-01

## 2024-02-28 ENCOUNTER — APPOINTMENT (OUTPATIENT)
Dept: CARDIOLOGY | Facility: CLINIC | Age: 59
End: 2024-02-28
Payer: COMMERCIAL

## 2024-02-28 ENCOUNTER — NON-APPOINTMENT (OUTPATIENT)
Age: 59
End: 2024-02-28

## 2024-02-28 VITALS
BODY MASS INDEX: 23.87 KG/M2 | WEIGHT: 196 LBS | HEART RATE: 72 BPM | OXYGEN SATURATION: 99 % | HEIGHT: 76 IN | TEMPERATURE: 98.2 F | DIASTOLIC BLOOD PRESSURE: 72 MMHG | SYSTOLIC BLOOD PRESSURE: 108 MMHG

## 2024-02-28 DIAGNOSIS — I34.0 NONRHEUMATIC MITRAL (VALVE) INSUFFICIENCY: ICD-10-CM

## 2024-02-28 DIAGNOSIS — I50.20 UNSPECIFIED SYSTOLIC (CONGESTIVE) HEART FAILURE: ICD-10-CM

## 2024-02-28 DIAGNOSIS — Z98.890 OTHER SPECIFIED POSTPROCEDURAL STATES: ICD-10-CM

## 2024-02-28 PROCEDURE — 93000 ELECTROCARDIOGRAM COMPLETE: CPT

## 2024-02-28 PROCEDURE — 99213 OFFICE O/P EST LOW 20 MIN: CPT

## 2024-02-28 RX ORDER — ASPIRIN 81 MG/1
81 TABLET, COATED ORAL
Qty: 90 | Refills: 3 | Status: ACTIVE | COMMUNITY
Start: 2023-01-24 | End: 1900-01-01

## 2024-02-28 NOTE — HISTORY OF PRESENT ILLNESS
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 Not associated with coffee or caffeine ingestion. At , he was on obs for 24 hours and had an echo that revealed a "leaky valve". Discharged home on meds. Had an TTE that revealed a thickened anterior mitral valve leaflet. Partial flail A2 with severe eccentric MR. The LA is moderately enlarged. Possible marantic endocarditis? In 5/24/2022 he had a LOULOU that revealed ruptured chordae with thickened and redundant mitral valve leaflet. Severe MR.  Mild TR. LVEF=57%.  Cardiac cath in Sept 15, 2022 revealed normal LVEF=75% with severe MR +4. Normal coronary arteries.  Underwent  MVR (repair with ring) on 11/10/2022 with Dr Pollock. No complications. Follow up TTE performed on 12/14/2022 revealed reduced LV function (LVEF=30-35%), with lateral and inferior wall revealed lateral and inferior wall akinesis. Echo done in 11/2023 revealed improved LVEF 45-50%. However, RV is dilated.  Today for follow up. At the present time patient is completely asymptomatic and denies CP, SOB, orthopnea or PND. Denies palpitations, dizziness or ankle swelling. Has been walking up to 1 mile with no symptoms. NYHA 1/4.  Doesnt smoke Occasional alcohol Denies the use of illicit drugs Received the COVID 19 vaccine No family history of heart disease.

## 2024-02-28 NOTE — PHYSICAL EXAM
[Well Developed] : well developed [Well Nourished] : well nourished [No Acute Distress] : no acute distress [Normal Conjunctiva] : normal conjunctiva [Normal Venous Pressure] : normal venous pressure [No Carotid Bruit] : no carotid bruit [Normal S1, S2] : normal S1, S2 [No Rub] : no rub [No Gallop] : no gallop [Murmur] : murmur [Clear Lung Fields] : clear lung fields [Good Air Entry] : good air entry [No Respiratory Distress] : no respiratory distress  [Soft] : abdomen soft [Non Tender] : non-tender [No Masses/organomegaly] : no masses/organomegaly [Normal Gait] : normal gait [Normal Bowel Sounds] : normal bowel sounds [No Edema] : no edema [No Cyanosis] : no cyanosis [No Clubbing] : no clubbing [No Varicosities] : no varicosities [No Rash] : no rash [No Skin Lesions] : no skin lesions [Moves all extremities] : moves all extremities [No Focal Deficits] : no focal deficits [Normal Speech] : normal speech [Alert and Oriented] : alert and oriented [Normal memory] : normal memory [de-identified] : Systolic murmur, late,1/6,  [de-identified] : Mid sternotomy scar.

## 2024-02-28 NOTE — DISCUSSION/SUMMARY
[FreeTextEntry1] : Mr. JAVI TELLEZ is a 58 year male post MVR for severe mitral regurgitation. Had MVR (repair with ring) on 11/10/2022. No complications. Echo following the surgery revealed reduced LV function (LVEF=30-35% with inferior and lateral akinesis. Echo done in 11/2023 revealed improved LVEF to 45-50%. Dilated RV. No signs or symptoms of heart failure. NYHA 1/4. On Entresto, Farxiga and MAR. Will do routine blood work. Follow up in 6 months

## 2024-02-28 NOTE — ASSESSMENT
[FreeTextEntry1] : ECG performed today at the office revealed a NSR 69 bpm, with normal AQRS, VA, QRS and QTc.

## 2024-03-14 ENCOUNTER — RX RENEWAL (OUTPATIENT)
Age: 59
End: 2024-03-14

## 2024-03-14 RX ORDER — SPIRONOLACTONE 25 MG/1
25 TABLET ORAL
Qty: 90 | Refills: 1 | Status: ACTIVE | COMMUNITY
Start: 2023-03-29 | End: 1900-01-01

## 2024-05-01 ENCOUNTER — APPOINTMENT (OUTPATIENT)
Dept: CARDIOLOGY | Facility: CLINIC | Age: 59
End: 2024-05-01

## 2024-05-22 ENCOUNTER — RX RENEWAL (OUTPATIENT)
Age: 59
End: 2024-05-22

## 2024-05-22 RX ORDER — SACUBITRIL AND VALSARTAN 49; 51 MG/1; MG/1
49-51 TABLET, FILM COATED ORAL TWICE DAILY
Qty: 180 | Refills: 3 | Status: ACTIVE | COMMUNITY
Start: 2024-05-22 | End: 1900-01-01

## 2024-06-19 ENCOUNTER — RX RENEWAL (OUTPATIENT)
Age: 59
End: 2024-06-19

## 2024-06-19 RX ORDER — METOPROLOL TARTRATE 25 MG/1
25 TABLET, FILM COATED ORAL
Qty: 180 | Refills: 3 | Status: ACTIVE | COMMUNITY
Start: 2022-01-18 | End: 1900-01-01

## 2024-08-07 ENCOUNTER — APPOINTMENT (OUTPATIENT)
Dept: CARDIOLOGY | Facility: CLINIC | Age: 59
End: 2024-08-07

## 2024-08-07 ENCOUNTER — NON-APPOINTMENT (OUTPATIENT)
Age: 59
End: 2024-08-07

## 2024-08-07 PROCEDURE — 93000 ELECTROCARDIOGRAM COMPLETE: CPT

## 2024-08-07 PROCEDURE — 99214 OFFICE O/P EST MOD 30 MIN: CPT

## 2024-09-04 ENCOUNTER — RX RENEWAL (OUTPATIENT)
Age: 59
End: 2024-09-04

## 2024-11-25 ENCOUNTER — APPOINTMENT (OUTPATIENT)
Dept: CARDIOLOGY | Facility: CLINIC | Age: 59
End: 2024-11-25
Payer: COMMERCIAL

## 2024-11-25 PROCEDURE — 93306 TTE W/DOPPLER COMPLETE: CPT

## 2024-11-27 ENCOUNTER — NON-APPOINTMENT (OUTPATIENT)
Age: 59
End: 2024-11-27

## 2025-02-24 ENCOUNTER — NON-APPOINTMENT (OUTPATIENT)
Age: 60
End: 2025-02-24

## 2025-02-26 ENCOUNTER — APPOINTMENT (OUTPATIENT)
Dept: CARDIOLOGY | Facility: CLINIC | Age: 60
End: 2025-02-26
Payer: COMMERCIAL

## 2025-02-26 ENCOUNTER — NON-APPOINTMENT (OUTPATIENT)
Age: 60
End: 2025-02-26

## 2025-02-26 VITALS
DIASTOLIC BLOOD PRESSURE: 74 MMHG | OXYGEN SATURATION: 97 % | HEART RATE: 73 BPM | HEIGHT: 76 IN | SYSTOLIC BLOOD PRESSURE: 102 MMHG | BODY MASS INDEX: 23.75 KG/M2 | WEIGHT: 195 LBS

## 2025-02-26 DIAGNOSIS — I50.1 LEFT VENTRICULAR FAILURE, UNSPECIFIED: ICD-10-CM

## 2025-02-26 DIAGNOSIS — Z98.890 OTHER SPECIFIED POSTPROCEDURAL STATES: ICD-10-CM

## 2025-02-26 DIAGNOSIS — I34.0 NONRHEUMATIC MITRAL (VALVE) INSUFFICIENCY: ICD-10-CM

## 2025-02-26 PROCEDURE — 93000 ELECTROCARDIOGRAM COMPLETE: CPT

## 2025-02-26 PROCEDURE — 99215 OFFICE O/P EST HI 40 MIN: CPT

## 2025-03-07 ENCOUNTER — RX RENEWAL (OUTPATIENT)
Age: 60
End: 2025-03-07

## 2025-06-24 ENCOUNTER — RX RENEWAL (OUTPATIENT)
Age: 60
End: 2025-06-24

## 2025-08-27 ENCOUNTER — APPOINTMENT (OUTPATIENT)
Dept: CARDIOLOGY | Facility: CLINIC | Age: 60
End: 2025-08-27
Payer: COMMERCIAL

## 2025-08-27 VITALS
SYSTOLIC BLOOD PRESSURE: 106 MMHG | BODY MASS INDEX: 23.5 KG/M2 | HEART RATE: 69 BPM | HEIGHT: 76 IN | OXYGEN SATURATION: 98 % | WEIGHT: 193 LBS | DIASTOLIC BLOOD PRESSURE: 76 MMHG

## 2025-08-27 DIAGNOSIS — I50.20 UNSPECIFIED SYSTOLIC (CONGESTIVE) HEART FAILURE: ICD-10-CM

## 2025-08-27 DIAGNOSIS — I05.0 RHEUMATIC MITRAL STENOSIS: ICD-10-CM

## 2025-08-27 DIAGNOSIS — Z98.890 OTHER SPECIFIED POSTPROCEDURAL STATES: ICD-10-CM

## 2025-08-27 PROCEDURE — 99214 OFFICE O/P EST MOD 30 MIN: CPT

## (undated) DEVICE — URETERAL CATH RED RUBBER 18FR (RED)

## (undated) DEVICE — NDL COUNTER FOAM AND MAGNET 40-70

## (undated) DEVICE — SUT ETHIBOND 2-0 30" SH-1 GREEN/WHITE

## (undated) DEVICE — GOWN XL W TOWEL

## (undated) DEVICE — SUT SILK 0 30" TIES

## (undated) DEVICE — SUT PROLENE 8-0 24" BV175-6

## (undated) DEVICE — TUBING TRUWAVE PRESSURE MALE/FEMALE 72"

## (undated) DEVICE — STAPLER SKIN PROXIMATE

## (undated) DEVICE — SUT PROLENE 3-0 36" MH

## (undated) DEVICE — PACING CABLE A/V TEMP SCREW DOWN 6FT

## (undated) DEVICE — VISITEC 4X4

## (undated) DEVICE — SPONGE PEANUT AUTO COUNT

## (undated) DEVICE — SUT SILK 2 30" MH

## (undated) DEVICE — TRAY IRRIGATION SYR BULB 60CC

## (undated) DEVICE — LIGATING CLIPS AESCULAP MEDIUM BLUE 24

## (undated) DEVICE — DRSG MEPILEX 10 X 25CM (4 X 10") AG

## (undated) DEVICE — PHRENIC NERVE PAD MEDIUM

## (undated) DEVICE — PACK VALVE

## (undated) DEVICE — GLV 6.5 PROTEXIS (BLUE)

## (undated) DEVICE — SUT PROLENE 4-0 36" RB-1

## (undated) DEVICE — SUT ETHIBOND 2-0 36" SH

## (undated) DEVICE — SUT PROLENE 5-0 30" RB-2

## (undated) DEVICE — GLV 8 PROTEXIS (WHITE)

## (undated) DEVICE — SUT PROLENE 5-0 36" RB-1

## (undated) DEVICE — TUBING MEDI-VAC W MAXIGRIP CONNECTORS 1/4"X6'

## (undated) DEVICE — DRSG OPSITE 13.75 X 4"

## (undated) DEVICE — DRSG TAPE TRANSPORE 3"

## (undated) DEVICE — SYR LUER LOK 20CC

## (undated) DEVICE — DRSG OPSITE 2.5 X 2"

## (undated) DEVICE — SUT SILK 0 30" SH

## (undated) DEVICE — SUT PROLENE 4-0 36" SH

## (undated) DEVICE — GLV 7 PROTEXIS (BLUE)

## (undated) DEVICE — MEDICATION LABELS W MARKER

## (undated) DEVICE — SUT MONOCRYL 4-0 27" PS-2 UNDYED

## (undated) DEVICE — TONGUE DEPRESSOR

## (undated) DEVICE — DRAPE IOBAN 33" X 23"

## (undated) DEVICE — SAW BLADE STRYKER SAGITTAL SAFEDGE 40.5X47.5X0.64

## (undated) DEVICE — SUT PROLENE 7-0 24" BV175-6

## (undated) DEVICE — SUT PERMAHAND SILK 2 60" TIES

## (undated) DEVICE — SUT ETHIBOND 4-0 36" RB-1

## (undated) DEVICE — DRSG MEPILEX 10 X 10CM (4 X 4") AG

## (undated) DEVICE — Device

## (undated) DEVICE — DRAPE SLUSH / WARMER 44 X 66"

## (undated) DEVICE — GLV 7.5 PROTEXIS (WHITE)

## (undated) DEVICE — SUT ETHIBOND 3-0 36" RB-1

## (undated) DEVICE — SUCTION YANKAUER NO CONTROL VENT

## (undated) DEVICE — SUT ETHIBOND 2-0 4-30" RB-1 WHITE

## (undated) DEVICE — MAXI-FLO SUCTION CATHETER KIT 14FR STRAIGHT

## (undated) DEVICE — GLV 8.5 PROTEXIS (WHITE)

## (undated) DEVICE — SUT ETHIBOND 2-0 4-30" RB-1 GREEN

## (undated) DEVICE — SUT PROLENE 3-0 36" SH

## (undated) DEVICE — GLV 6 PROTEXIS (WHITE)

## (undated) DEVICE — SUT SILK 5-0 60" TIES

## (undated) DEVICE — ELCTR MULTIFUNCTION DEFIBRILLATION ELECTRODE EDGE SYSTEM ADULT

## (undated) DEVICE — SUT VICRYL 2-0 27" CT-1 UNDYED

## (undated) DEVICE — STOPCOCK 3 WAY W SWIVEL MALE LUER LOCK

## (undated) DEVICE — SUT VICRYL 2 27" TP-1 UNDYED

## (undated) DEVICE — SET BLOOD Y-TYPE

## (undated) DEVICE — ELCTR BOVIE BLADE 3/4" EXTENDED LENGTH 6"

## (undated) DEVICE — SUT PROLENE 7-0 30" BV-1

## (undated) DEVICE — SUT BLUNT SZ 5

## (undated) DEVICE — DRSG TEGADERM 4X4.75"

## (undated) DEVICE — SUT VICRYL 0 36" CTX UNDYED

## (undated) DEVICE — GLV 7 PROTEXIS (WHITE)

## (undated) DEVICE — PREP SCRUB BRUSH W CHG 4%

## (undated) DEVICE — SUT VICRYL 3-0 27" CT-1

## (undated) DEVICE — CONNECTOR STRAIGHT 3/8 X 1/2"

## (undated) DEVICE — MULTIPLE PERFUSION SET FEMALE 1 INLET LEG W 4 LEGS 15" (BLUE/RED)

## (undated) DEVICE — GOWN TRIMAX LG

## (undated) DEVICE — SUT ETHIBOND 5 4-30" CCS

## (undated) DEVICE — DRAPE TOWEL BLUE 17" X 24"

## (undated) DEVICE — WARMING BLANKET DUO-THERM HYPER/HYPOTHERM ADULT

## (undated) DEVICE — VESSEL LOOP ASPEN SUPERMAXI BLUE

## (undated) DEVICE — SUT DOUBLE 6 WIRE STERNAL

## (undated) DEVICE — SOL IRR POUR NS 0.9% 500ML

## (undated) DEVICE — CONNECTOR "Y" 3/8 X 3/8 X 3/8"

## (undated) DEVICE — SUT STAINLESS STEEL 5 18" SCC

## (undated) DEVICE — PRESSURE INFUSOR BAG 500ML

## (undated) DEVICE — SUT VICRYL 0 54" REEL UNDYED

## (undated) DEVICE — SUT SILK 2-0 18" SH (POP-OFF)

## (undated) DEVICE — TOURNIQUET SET TOURNIKWIK 12FR (4 TUBES, 1 SNARE) 7.5"

## (undated) DEVICE — SOL ANTI FOG

## (undated) DEVICE — GLV 6.5 PROTEXIS (WHITE)

## (undated) DEVICE — ELCTR BOVIE TIP BLADE MEGADYNE E-Z CLEAN 2.5" (SHORT)

## (undated) DEVICE — SUT SOFSILK 4-0 24" CV-15

## (undated) DEVICE — LAP PAD 18 X 18"

## (undated) DEVICE — CONNECTOR "Y" 1/2 X 1/2 X 3/8"

## (undated) DEVICE — ELCTR BOVIE TIP BLADE MEGADYNE E-Z CLEAN 6.5" (LONG)

## (undated) DEVICE — SUT PDS II 2-0 27" CT-1

## (undated) DEVICE — SUT PLEDGET 9MM X 4MM X 1.5MM

## (undated) DEVICE — TUBING SUCTION 20FT

## (undated) DEVICE — MARKING PEN W RULER

## (undated) DEVICE — CONNECTOR "Y" 1/2 X 3/8 X 3/8"

## (undated) DEVICE — STOPCOCK 3 WAY W TUBE 35"

## (undated) DEVICE — DRAPE TOWEL WHITE 17" X 24"

## (undated) DEVICE — RANGER BLOOD/FLUID WARMING SET DISP

## (undated) DEVICE — SUT VICRYL 1 36" CTX UNDYED

## (undated) DEVICE — PREP CHLORAPREP HI-LITE ORANGE 26ML

## (undated) DEVICE — NDL HYPO SAFE 18G X 1.5" (PINK)

## (undated) DEVICE — SUT SILK 0 30" KS

## (undated) DEVICE — FRAZIER SUCTION TIP 10FR

## (undated) DEVICE — PRESSURE INFUSOR BAG 1000ML

## (undated) DEVICE — SWITCH ARISS TABLE MOUNT UNEQUAL LEGS 13"

## (undated) DEVICE — DRSG CURITY GAUZE SPONGE 4 X 4" 12-PLY

## (undated) DEVICE — GLV 7.5 SENSICARE W ALOE

## (undated) DEVICE — SUT PROLENE 6-0 30" C-1

## (undated) DEVICE — DRSG MEPILEX 10 X 30CM (4 X 12") AG

## (undated) DEVICE — SUT ETHIBOND 2-0 30" V5 WHITE

## (undated) DEVICE — CHEST DRAIN OASIS DRY SUCTION WATER SEAL